# Patient Record
Sex: MALE | Race: WHITE | NOT HISPANIC OR LATINO | URBAN - METROPOLITAN AREA
[De-identification: names, ages, dates, MRNs, and addresses within clinical notes are randomized per-mention and may not be internally consistent; named-entity substitution may affect disease eponyms.]

---

## 2017-11-07 ENCOUNTER — ON CAMPUS - OUTPATIENT (AMBULATORY)
Dept: URBAN - METROPOLITAN AREA HOSPITAL 2 | Facility: HOSPITAL | Age: 67
End: 2017-11-07

## 2017-11-07 VITALS
DIASTOLIC BLOOD PRESSURE: 93 MMHG | HEART RATE: 61 BPM | DIASTOLIC BLOOD PRESSURE: 82 MMHG | HEART RATE: 58 BPM | OXYGEN SATURATION: 94 % | DIASTOLIC BLOOD PRESSURE: 75 MMHG | SYSTOLIC BLOOD PRESSURE: 147 MMHG | SYSTOLIC BLOOD PRESSURE: 128 MMHG | DIASTOLIC BLOOD PRESSURE: 89 MMHG | SYSTOLIC BLOOD PRESSURE: 126 MMHG | RESPIRATION RATE: 18 BRPM | SYSTOLIC BLOOD PRESSURE: 117 MMHG | WEIGHT: 225 LBS | TEMPERATURE: 97.3 F | DIASTOLIC BLOOD PRESSURE: 73 MMHG | SYSTOLIC BLOOD PRESSURE: 113 MMHG | HEART RATE: 60 BPM | OXYGEN SATURATION: 98 % | HEART RATE: 62 BPM | DIASTOLIC BLOOD PRESSURE: 72 MMHG | HEIGHT: 72 IN | OXYGEN SATURATION: 100 % | SYSTOLIC BLOOD PRESSURE: 115 MMHG | RESPIRATION RATE: 17 BRPM | SYSTOLIC BLOOD PRESSURE: 148 MMHG | SYSTOLIC BLOOD PRESSURE: 112 MMHG | OXYGEN SATURATION: 99 % | DIASTOLIC BLOOD PRESSURE: 69 MMHG | HEART RATE: 64 BPM | RESPIRATION RATE: 16 BRPM

## 2017-11-07 DIAGNOSIS — Z09 ENCOUNTER FOR FOLLOW-UP EXAMINATION AFTER COMPLETED TREATMEN: ICD-10-CM

## 2017-11-07 DIAGNOSIS — Z86.010 PERSONAL HISTORY OF COLONIC POLYPS: ICD-10-CM

## 2017-11-07 DIAGNOSIS — K64.1 SECOND DEGREE HEMORRHOIDS: ICD-10-CM

## 2017-11-07 PROCEDURE — G0105 COLORECTAL SCRN; HI RISK IND: HCPCS | Performed by: INTERNAL MEDICINE

## 2017-11-07 RX ADMIN — PROPOFOL: 10 INJECTION, EMULSION INTRAVENOUS at 10:16

## 2017-12-07 ENCOUNTER — HOSPITAL ENCOUNTER (OUTPATIENT)
Dept: FAMILY MEDICINE CLINIC | Facility: CLINIC | Age: 67
Setting detail: SPECIMEN
Discharge: HOME | End: 2017-12-07
Attending: FAMILY MEDICINE | Admitting: FAMILY MEDICINE

## 2017-12-07 LAB
ALBUMIN SERPL-MCNC: 4.3 G/DL (ref 3.5–4.8)
ALBUMIN/GLOB SERPL: 1.5 {RATIO} (ref 1–1.7)
ALP SERPL-CCNC: 40 IU/L (ref 32–91)
ALT SERPL-CCNC: 28 IU/L (ref 17–63)
ANION GAP SERPL CALC-SCNC: 11.1 MMOL/L (ref 10–20)
AST SERPL-CCNC: 31 IU/L (ref 15–41)
BASOPHILS # BLD AUTO: 0.1 10*3/UL (ref 0–0.2)
BASOPHILS NFR BLD AUTO: 1 % (ref 0–2)
BILIRUB SERPL-MCNC: 1 MG/DL (ref 0.3–1.2)
BILIRUB UR QL STRIP: NEGATIVE MG/DL
BUN SERPL-MCNC: 17 MG/DL (ref 8–20)
BUN/CREAT SERPL: 15.5 (ref 6.2–20.3)
CALCIUM SERPL-MCNC: 9.3 MG/DL (ref 8.9–10.3)
CASTS URNS QL MICRO: NORMAL /[LPF]
CHLORIDE SERPL-SCNC: 101 MMOL/L (ref 101–111)
CHOLEST SERPL-MCNC: 209 MG/DL
CHOLEST/HDLC SERPL: 4.8 {RATIO}
COLOR UR: YELLOW
CONV BACTERIA IN URINE MICRO: NEGATIVE
CONV CLARITY OF URINE: CLEAR
CONV CO2: 30 MMOL/L (ref 22–32)
CONV HYALINE CASTS IN URINE MICRO: 0 /[LPF] (ref 0–5)
CONV LDL CHOLESTEROL DIRECT: 120 MG/DL (ref 0–100)
CONV PROTEIN IN URINE BY AUTOMATED TEST STRIP: NEGATIVE MG/DL
CONV SMALL ROUND CELLS: NORMAL /[HPF]
CONV TOTAL PROTEIN: 7.1 G/DL (ref 6.1–7.9)
CONV UROBILINOGEN IN URINE BY AUTOMATED TEST STRIP: 0.2 MG/DL
CREAT UR-MCNC: 1.1 MG/DL (ref 0.7–1.2)
CULTURE INDICATED?: NORMAL
DIFFERENTIAL METHOD BLD: (no result)
EOSINOPHIL # BLD AUTO: 0.3 10*3/UL (ref 0–0.3)
EOSINOPHIL # BLD AUTO: 5 % (ref 0–3)
ERYTHROCYTE [DISTWIDTH] IN BLOOD BY AUTOMATED COUNT: 13.2 % (ref 11.5–14.5)
GLOBULIN UR ELPH-MCNC: 2.8 G/DL (ref 2.5–3.8)
GLUCOSE SERPL-MCNC: 94 MG/DL (ref 65–99)
GLUCOSE UR QL: NEGATIVE MG/DL
HCT VFR BLD AUTO: 42.4 % (ref 40–54)
HDLC SERPL-MCNC: 44 MG/DL
HGB BLD-MCNC: 14.5 G/DL (ref 14–18)
HGB UR QL STRIP: NEGATIVE
KETONES UR QL STRIP: NEGATIVE MG/DL
LDLC/HDLC SERPL: 2.7 {RATIO}
LEUKOCYTE ESTERASE UR QL STRIP: NEGATIVE
LIPID INTERPRETATION: ABNORMAL
LYMPHOCYTES # BLD AUTO: 2.1 10*3/UL (ref 0.8–4.8)
LYMPHOCYTES NFR BLD AUTO: 33 % (ref 18–42)
MCH RBC QN AUTO: 29.7 PG (ref 26–32)
MCHC RBC AUTO-ENTMCNC: 34.3 G/DL (ref 32–36)
MCV RBC AUTO: 86.6 FL (ref 80–94)
MONOCYTES # BLD AUTO: 0.7 10*3/UL (ref 0.1–1.3)
MONOCYTES NFR BLD AUTO: 11 % (ref 2–11)
NEUTROPHILS # BLD AUTO: 3.2 10*3/UL (ref 2.3–8.6)
NEUTROPHILS NFR BLD AUTO: 50 % (ref 50–75)
NITRITE UR QL STRIP: NEGATIVE
NRBC BLD AUTO-RTO: 0 /100{WBCS}
NRBC/RBC NFR BLD MANUAL: 0 10*3/UL
PH UR STRIP.AUTO: 7.5 [PH] (ref 4.5–8)
PLATELET # BLD AUTO: 280 10*3/UL (ref 150–450)
PMV BLD AUTO: 7.8 FL (ref 7.4–10.4)
POTASSIUM SERPL-SCNC: 4.1 MMOL/L (ref 3.6–5.1)
PSA SERPL-MCNC: 1.27 NG/ML (ref 0–4)
RBC # BLD AUTO: 4.89 10*6/UL (ref 4.6–6)
RBC #/AREA URNS HPF: 0 /[HPF] (ref 0–3)
SODIUM SERPL-SCNC: 138 MMOL/L (ref 136–144)
SP GR UR: 1.01 (ref 1–1.03)
SPERM URNS QL MICRO: NORMAL /[HPF]
SQUAMOUS SPT QL MICRO: 0 /[HPF] (ref 0–5)
T4 FREE SERPL-MCNC: 0.75 NG/DL (ref 0.58–1.64)
TRIGL SERPL-MCNC: 194 MG/DL
TSH SERPL-ACNC: 2.78 UIU/ML (ref 0.34–5.6)
UNIDENT CRYS URNS QL MICRO: NORMAL /[HPF]
VIT B12 SERPL-MCNC: 209 PG/ML (ref 180–914)
VLDLC SERPL CALC-MCNC: 45.7 MG/DL
WBC # BLD AUTO: 6.4 10*3/UL (ref 4.5–11.5)
WBC #/AREA URNS HPF: 0 /[HPF] (ref 0–5)
YEAST SPEC QL WET PREP: NORMAL /[HPF]

## 2018-12-05 ENCOUNTER — HOSPITAL ENCOUNTER (OUTPATIENT)
Dept: FAMILY MEDICINE CLINIC | Facility: CLINIC | Age: 68
Setting detail: SPECIMEN
Discharge: HOME | End: 2018-12-05
Attending: FAMILY MEDICINE | Admitting: FAMILY MEDICINE

## 2018-12-05 LAB
25(OH)D3 SERPL-MCNC: 71 NG/ML (ref 30–100)
ALBUMIN SERPL-MCNC: 4 G/DL (ref 3.5–4.8)
ALBUMIN/GLOB SERPL: 1.5 {RATIO} (ref 1–1.7)
ALP SERPL-CCNC: 43 IU/L (ref 32–91)
ALT SERPL-CCNC: 20 IU/L (ref 17–63)
ANION GAP SERPL CALC-SCNC: 11 MMOL/L (ref 10–20)
AST SERPL-CCNC: 26 IU/L (ref 15–41)
BASOPHILS # BLD AUTO: 0.1 10*3/UL (ref 0–0.2)
BASOPHILS NFR BLD AUTO: 1 % (ref 0–2)
BILIRUB SERPL-MCNC: 1 MG/DL (ref 0.3–1.2)
BILIRUB UR QL STRIP: NEGATIVE MG/DL
BUN SERPL-MCNC: 17 MG/DL (ref 8–20)
BUN/CREAT SERPL: 14.2 (ref 6.2–20.3)
CALCIUM SERPL-MCNC: 9.1 MG/DL (ref 8.9–10.3)
CASTS URNS QL MICRO: NORMAL /[LPF]
CHLORIDE SERPL-SCNC: 99 MMOL/L (ref 101–111)
CHOLEST SERPL-MCNC: 199 MG/DL
CHOLEST/HDLC SERPL: 4.3 {RATIO}
COLOR UR: YELLOW
CONV BACTERIA IN URINE MICRO: NEGATIVE
CONV CLARITY OF URINE: CLEAR
CONV CO2: 31 MMOL/L (ref 22–32)
CONV HYALINE CASTS IN URINE MICRO: 0 /[LPF] (ref 0–5)
CONV LDL CHOLESTEROL DIRECT: 118 MG/DL (ref 0–100)
CONV PROTEIN IN URINE BY AUTOMATED TEST STRIP: NEGATIVE MG/DL
CONV SMALL ROUND CELLS: NORMAL /[HPF]
CONV TOTAL PROTEIN: 6.6 G/DL (ref 6.1–7.9)
CONV UROBILINOGEN IN URINE BY AUTOMATED TEST STRIP: 0.2 MG/DL
CREAT UR-MCNC: 1.2 MG/DL (ref 0.7–1.2)
CULTURE INDICATED?: NORMAL
DIFFERENTIAL METHOD BLD: (no result)
EOSINOPHIL # BLD AUTO: 0.3 10*3/UL (ref 0–0.3)
EOSINOPHIL # BLD AUTO: 5 % (ref 0–3)
ERYTHROCYTE [DISTWIDTH] IN BLOOD BY AUTOMATED COUNT: 13.4 % (ref 11.5–14.5)
GLOBULIN UR ELPH-MCNC: 2.6 G/DL (ref 2.5–3.8)
GLUCOSE SERPL-MCNC: 98 MG/DL (ref 65–99)
GLUCOSE UR QL: NEGATIVE MG/DL
HBA1C MFR BLD: 5.5 % (ref 0–5.6)
HCT VFR BLD AUTO: 41.1 % (ref 40–54)
HDLC SERPL-MCNC: 46 MG/DL
HGB BLD-MCNC: 14.5 G/DL (ref 14–18)
HGB UR QL STRIP: NEGATIVE
KETONES UR QL STRIP: NEGATIVE MG/DL
LDLC/HDLC SERPL: 2.5 {RATIO}
LEUKOCYTE ESTERASE UR QL STRIP: NEGATIVE
LIPID INTERPRETATION: ABNORMAL
LYMPHOCYTES # BLD AUTO: 1.8 10*3/UL (ref 0.8–4.8)
LYMPHOCYTES NFR BLD AUTO: 32 % (ref 18–42)
MCH RBC QN AUTO: 30.6 PG (ref 26–32)
MCHC RBC AUTO-ENTMCNC: 35.3 G/DL (ref 32–36)
MCV RBC AUTO: 86.7 FL (ref 80–94)
MONOCYTES # BLD AUTO: 0.6 10*3/UL (ref 0.1–1.3)
MONOCYTES NFR BLD AUTO: 11 % (ref 2–11)
NEUTROPHILS # BLD AUTO: 2.9 10*3/UL (ref 2.3–8.6)
NEUTROPHILS NFR BLD AUTO: 51 % (ref 50–75)
NITRITE UR QL STRIP: NEGATIVE
NRBC BLD AUTO-RTO: 0 /100{WBCS}
NRBC/RBC NFR BLD MANUAL: 0 10*3/UL
PH UR STRIP.AUTO: 7.5 [PH] (ref 4.5–8)
PLATELET # BLD AUTO: 275 10*3/UL (ref 150–450)
PMV BLD AUTO: 7.2 FL (ref 7.4–10.4)
POTASSIUM SERPL-SCNC: 4 MMOL/L (ref 3.6–5.1)
PSA SERPL-MCNC: 0.94 NG/ML (ref 0–4)
RBC # BLD AUTO: 4.75 10*6/UL (ref 4.6–6)
RBC #/AREA URNS HPF: 0 /[HPF] (ref 0–3)
SODIUM SERPL-SCNC: 137 MMOL/L (ref 136–144)
SP GR UR: 1.01 (ref 1–1.03)
SPERM URNS QL MICRO: NORMAL /[HPF]
SQUAMOUS SPT QL MICRO: 0 /[HPF] (ref 0–5)
T4 FREE SERPL-MCNC: 0.92 NG/DL (ref 0.58–1.64)
TRIGL SERPL-MCNC: 162 MG/DL
TSH SERPL-ACNC: 3.17 UIU/ML (ref 0.34–5.6)
UNIDENT CRYS URNS QL MICRO: NORMAL /[HPF]
VIT B12 SERPL-MCNC: 271 PG/ML (ref 180–914)
VLDLC SERPL CALC-MCNC: 35.2 MG/DL
WBC # BLD AUTO: 5.7 10*3/UL (ref 4.5–11.5)
WBC #/AREA URNS HPF: 0 /[HPF] (ref 0–5)
YEAST SPEC QL WET PREP: NORMAL /[HPF]

## 2019-09-25 ENCOUNTER — OFFICE VISIT (OUTPATIENT)
Dept: PODIATRY | Facility: CLINIC | Age: 69
End: 2019-09-25

## 2019-09-25 VITALS
DIASTOLIC BLOOD PRESSURE: 89 MMHG | HEART RATE: 64 BPM | HEIGHT: 72 IN | WEIGHT: 225 LBS | SYSTOLIC BLOOD PRESSURE: 146 MMHG | BODY MASS INDEX: 30.48 KG/M2

## 2019-09-25 DIAGNOSIS — M72.2 PLANTAR FASCIITIS, LEFT: Primary | ICD-10-CM

## 2019-09-25 PROCEDURE — 99203 OFFICE O/P NEW LOW 30 MIN: CPT | Performed by: PODIATRIST

## 2019-09-25 RX ORDER — SILDENAFIL 100 MG/1
TABLET, FILM COATED ORAL
COMMUNITY
Start: 2015-10-22 | End: 2022-11-11 | Stop reason: SDUPTHER

## 2019-09-25 RX ORDER — ATENOLOL 25 MG/1
TABLET ORAL EVERY 24 HOURS
COMMUNITY
Start: 2018-06-08 | End: 2019-11-19 | Stop reason: SDUPTHER

## 2019-09-25 RX ORDER — TRIAMTERENE AND HYDROCHLOROTHIAZIDE 37.5; 25 MG/1; MG/1
CAPSULE ORAL
COMMUNITY
Start: 2019-07-02 | End: 2019-12-30

## 2019-09-25 RX ORDER — ERGOCALCIFEROL 1.25 MG/1
CAPSULE ORAL
COMMUNITY
Start: 2019-08-20 | End: 2019-10-23 | Stop reason: SDUPTHER

## 2019-09-25 RX ORDER — ERGOCALCIFEROL 1.25 MG/1
1 CAPSULE ORAL
COMMUNITY
Start: 2018-03-05 | End: 2020-12-07 | Stop reason: SDUPTHER

## 2019-09-25 NOTE — PROGRESS NOTES
09/25/2019  Foot and Ankle Surgery - New Patient   Provider: Dr. Benny Villegas DPM  Location: [facility]    Subjective:  Luis Enrique Aldana is a 69 y.o. male.     Chief Complaint   Patient presents with   • Left Foot - Pain       HPI: Patient is a 69-year-old male that presents with recent onset of pain involving the left foot.  He localizes discomfort to the arch region.  Symptoms started approximately 6 weeks ago after vacation.  He states that he did do a fairly significant amount of hiking with symptoms starting the following day.  He did notice some improvement until approximately 2 weeks ago when exacerbation was noted.  Today, he rates his pain a 2 out of 10.  He states that it feels like a bruise.  He has not had issues in the past.  Denies any injury.    No Known Allergies    Past Medical History:   Diagnosis Date   • Hypertension        History reviewed. No pertinent surgical history.    Family History   Problem Relation Age of Onset   • Cancer Father    • Hypertension Father    • Diabetes Maternal Grandfather        Social History     Socioeconomic History   • Marital status:      Spouse name: Not on file   • Number of children: Not on file   • Years of education: Not on file   • Highest education level: Not on file   Tobacco Use   • Smoking status: Never Smoker   Substance and Sexual Activity   • Alcohol use: Yes   • Drug use: No   • Sexual activity: Defer        Current Outpatient Medications on File Prior to Visit   Medication Sig Dispense Refill   • atenolol (TENORMIN) 25 MG tablet Daily.     • sildenafil (VIAGRA) 100 MG tablet VIAGRA 100 MG TABS     • triamterene-hydrochlorothiazide (DYAZIDE) 37.5-25 MG per capsule      • vitamin D (ERGOCALCIFEROL) 25902 units capsule capsule      • vitamin D (ERGOCALCIFEROL) 32760 units capsule capsule 1 capsule Every 7 (Seven) Days.       No current facility-administered medications on file prior to visit.        Review of Systems:  General: Denies  "fever, chills, fatigue, and weakness.  Eyes: Denies vision loss, blurry vision, and excessive redness.  ENT: Denies hearing issues and difficulty swallowing.  Cardiovascular: Denies palpitations, chest pain, or syncopal episodes.  Respiratory: Denies shortness of breath, wheezing, and coughing.  GI: Denies abdominal pain, nausea, and vomiting.   : Denies frequency, hematuria, and urgency.  Musculoskeletal: + Left foot pain  Derm: Denies rash, open wounds, or suspicious lesions.  Neuro: Denies headaches, numbness, loss of coordination, and tremors.  Psych: Denies anxiety and depression.  Endocrine: Denies temperature intolerance and changes in appetite.  Heme: Denies bleeding disorders or abnormal bruising.     Objective   /89   Pulse 64   Ht 182.9 cm (72\")   Wt 102 kg (225 lb)   BMI 30.52 kg/m²     General Exam  Appearance: appears stated age and healthy   Orientation: alert and oriented to person, place, and time   Affect: appropriate   Gait: unimpaired     Foot/Ankle Exam  Inspection and Palpation  Ecchymosis - Left: none  Tenderness - Left: none   Swelling - Left: none    Arch - Left: normal  Hammertoes - Left: absent  Claw Toes - Left: absent  Mallet Toes - Left: absent  Hallux Valgus - Left: no  Hallux Limitus - Left: no  Skin - Left: skin intact     Vascular  Dorsalis Pedis - Left: 3+  Posterior Tibial - Left: 3+  Skin Temperature -  Left: warm    CFT - Left: < 3 seconds    Neurologic  Saphenous Nerve Sensation  - Left: normal  Tibial Nerve Sensation - Left: normal  Superficial Peroneal Nerve Sensation - Left: normal  Deep Peroneal Nerve Sensation - Left: normal  Sural Nerve Sensation - Left: normal  Achilles Reflex - Left: 2+    Muscle Strength  Dorsiflexion - Left: 5  Plantar Flexion - Left: 5  Eversion - Left: 5  Inversion - Left: 5  Great Toe Extension - Left: 5  Great Toe Flexion - Left: 5    Range of Motion  Normal left ankle ROM    Left Foot/Ankle Comments  Mild point discomfort noted to the " plantar medial arch region just distal to the plantar fascia origin.  No gross deformity or instability        Assessment/Plan   Luis Enrique was seen today for pain.    Diagnoses and all orders for this visit:    Plantar fasciitis, left  -     XR Foot 3+ View Left      Patient presents with sudden onset of discomfort involving the plantar medial aspect of the left foot.  Imaging was reviewed showing no obvious fractures, dislocations or degenerative changes.  He does have a small inferior calcaneal spur formation.  On exam, he has point discomfort involving the medial band of the plantar fascia.  I explained the diagnosis and treatment options.  No significant inflammation is present today and he is relatively asymptomatic.  I have asked that he acquire a pair of over-the-counter arch supports.  We did discuss proper use and effects.  I would like him to start stretching and manual therapy exercises on a routine basis.  Symptoms should gradually improve.  I have asked that he call with any additional questions or concerns.  I will see him on an as-needed basis.    Orders Placed This Encounter   Procedures   • XR Foot 3+ View Left     Weight Bearing     Order Specific Question:   Reason for Exam:     Answer:   Left foot pain for about 2 months mainly in the arch RM 13 WB        Note is dictated utilizing voice recognition software. Unfortunately this leads to occasional typographical errors. I apologize in advance if the situation occurs. If questions occur please do not hesitate to call our office.

## 2019-10-23 RX ORDER — ERGOCALCIFEROL 1.25 MG/1
CAPSULE ORAL
Qty: 12 CAPSULE | Refills: 0 | Status: SHIPPED | OUTPATIENT
Start: 2019-10-23 | End: 2020-01-14

## 2019-11-19 RX ORDER — ATENOLOL 25 MG/1
TABLET ORAL
Qty: 90 TABLET | Refills: 4 | Status: SHIPPED | OUTPATIENT
Start: 2019-11-19 | End: 2020-12-08

## 2019-12-30 RX ORDER — TRIAMTERENE AND HYDROCHLOROTHIAZIDE 37.5; 25 MG/1; MG/1
CAPSULE ORAL
Qty: 90 CAPSULE | Refills: 4 | Status: SHIPPED | OUTPATIENT
Start: 2019-12-30 | End: 2021-03-24

## 2020-01-14 RX ORDER — ERGOCALCIFEROL 1.25 MG/1
CAPSULE ORAL
Qty: 13 CAPSULE | Refills: 1 | Status: SHIPPED | OUTPATIENT
Start: 2020-01-14 | End: 2020-10-08

## 2020-01-14 RX ORDER — ERGOCALCIFEROL 1.25 MG/1
CAPSULE ORAL
Qty: 12 CAPSULE | Refills: 0 | Status: SHIPPED | OUTPATIENT
Start: 2020-01-14 | End: 2020-01-14

## 2020-10-08 RX ORDER — ERGOCALCIFEROL 1.25 MG/1
CAPSULE ORAL
Qty: 13 CAPSULE | Refills: 1 | Status: SHIPPED | OUTPATIENT
Start: 2020-10-08 | End: 2021-03-22

## 2020-12-07 ENCOUNTER — OFFICE VISIT (OUTPATIENT)
Dept: FAMILY MEDICINE CLINIC | Facility: CLINIC | Age: 70
End: 2020-12-07

## 2020-12-07 ENCOUNTER — LAB (OUTPATIENT)
Dept: FAMILY MEDICINE CLINIC | Facility: CLINIC | Age: 70
End: 2020-12-07

## 2020-12-07 VITALS
TEMPERATURE: 96.8 F | RESPIRATION RATE: 16 BRPM | DIASTOLIC BLOOD PRESSURE: 100 MMHG | BODY MASS INDEX: 31.69 KG/M2 | SYSTOLIC BLOOD PRESSURE: 150 MMHG | OXYGEN SATURATION: 97 % | HEIGHT: 72 IN | HEART RATE: 65 BPM | WEIGHT: 234 LBS

## 2020-12-07 DIAGNOSIS — Z23 IMMUNIZATION DUE: ICD-10-CM

## 2020-12-07 DIAGNOSIS — R97.20 ELEVATED PROSTATE SPECIFIC ANTIGEN (PSA): ICD-10-CM

## 2020-12-07 DIAGNOSIS — E55.9 VITAMIN D DEFICIENCY: ICD-10-CM

## 2020-12-07 DIAGNOSIS — I10 ESSENTIAL HYPERTENSION: ICD-10-CM

## 2020-12-07 DIAGNOSIS — E78.2 MIXED HYPERLIPIDEMIA: ICD-10-CM

## 2020-12-07 DIAGNOSIS — R79.9 ABNORMAL FINDING OF BLOOD CHEMISTRY, UNSPECIFIED: ICD-10-CM

## 2020-12-07 DIAGNOSIS — Z00.00 MEDICARE ANNUAL WELLNESS VISIT, SUBSEQUENT: Primary | ICD-10-CM

## 2020-12-07 LAB
25(OH)D3 SERPL-MCNC: 46.2 NG/ML (ref 30–100)
ALBUMIN SERPL-MCNC: 4.8 G/DL (ref 3.5–5.2)
ALBUMIN/GLOB SERPL: 1.7 G/DL
ALP SERPL-CCNC: 45 U/L (ref 39–117)
ALT SERPL W P-5'-P-CCNC: 16 U/L (ref 1–41)
ANION GAP SERPL CALCULATED.3IONS-SCNC: 11.8 MMOL/L (ref 5–15)
AST SERPL-CCNC: 23 U/L (ref 1–40)
BASOPHILS # BLD AUTO: 0.1 10*3/MM3 (ref 0–0.2)
BASOPHILS NFR BLD AUTO: 1 % (ref 0–1.5)
BILIRUB SERPL-MCNC: 0.5 MG/DL (ref 0–1.2)
BILIRUB UR QL STRIP: NEGATIVE
BUN SERPL-MCNC: 18 MG/DL (ref 8–23)
BUN/CREAT SERPL: 14.4 (ref 7–25)
CALCIUM SPEC-SCNC: 9.9 MG/DL (ref 8.6–10.5)
CHLORIDE SERPL-SCNC: 100 MMOL/L (ref 98–107)
CHOLEST SERPL-MCNC: 190 MG/DL (ref 0–200)
CLARITY UR: CLEAR
CO2 SERPL-SCNC: 29.2 MMOL/L (ref 22–29)
COLOR UR: YELLOW
CREAT SERPL-MCNC: 1.25 MG/DL (ref 0.76–1.27)
DEPRECATED RDW RBC AUTO: 40.3 FL (ref 37–54)
EOSINOPHIL # BLD AUTO: 0.4 10*3/MM3 (ref 0–0.4)
EOSINOPHIL NFR BLD AUTO: 5.9 % (ref 0.3–6.2)
ERYTHROCYTE [DISTWIDTH] IN BLOOD BY AUTOMATED COUNT: 13.8 % (ref 12.3–15.4)
GFR SERPL CREATININE-BSD FRML MDRD: 57 ML/MIN/1.73
GLOBULIN UR ELPH-MCNC: 2.9 GM/DL
GLUCOSE SERPL-MCNC: 98 MG/DL (ref 65–99)
GLUCOSE UR STRIP-MCNC: NEGATIVE MG/DL
HBA1C MFR BLD: 6 % (ref 3.5–5.6)
HCT VFR BLD AUTO: 46.4 % (ref 37.5–51)
HDLC SERPL-MCNC: 46 MG/DL (ref 40–60)
HGB BLD-MCNC: 15.3 G/DL (ref 13–17.7)
HGB UR QL STRIP.AUTO: NEGATIVE
KETONES UR QL STRIP: NEGATIVE
LDLC SERPL CALC-MCNC: 117 MG/DL (ref 0–100)
LDLC/HDLC SERPL: 2.46 {RATIO}
LEUKOCYTE ESTERASE UR QL STRIP.AUTO: NEGATIVE
LYMPHOCYTES # BLD AUTO: 2 10*3/MM3 (ref 0.7–3.1)
LYMPHOCYTES NFR BLD AUTO: 30.9 % (ref 19.6–45.3)
MCH RBC QN AUTO: 28 PG (ref 26.6–33)
MCHC RBC AUTO-ENTMCNC: 33 G/DL (ref 31.5–35.7)
MCV RBC AUTO: 84.9 FL (ref 79–97)
MONOCYTES # BLD AUTO: 0.7 10*3/MM3 (ref 0.1–0.9)
MONOCYTES NFR BLD AUTO: 11.3 % (ref 5–12)
NEUTROPHILS NFR BLD AUTO: 3.2 10*3/MM3 (ref 1.7–7)
NEUTROPHILS NFR BLD AUTO: 50.9 % (ref 42.7–76)
NITRITE UR QL STRIP: NEGATIVE
NRBC BLD AUTO-RTO: 0 /100 WBC (ref 0–0.2)
PH UR STRIP.AUTO: 7.5 [PH] (ref 5–8)
PLATELET # BLD AUTO: 328 10*3/MM3 (ref 140–450)
PMV BLD AUTO: 7.6 FL (ref 6–12)
POTASSIUM SERPL-SCNC: 5 MMOL/L (ref 3.5–5.2)
PROT SERPL-MCNC: 7.7 G/DL (ref 6–8.5)
PROT UR QL STRIP: NEGATIVE
PSA SERPL-MCNC: 1.96 NG/ML (ref 0–4)
RBC # BLD AUTO: 5.47 10*6/MM3 (ref 4.14–5.8)
SODIUM SERPL-SCNC: 141 MMOL/L (ref 136–145)
SP GR UR STRIP: 1.02 (ref 1–1.03)
T4 FREE SERPL-MCNC: 1.22 NG/DL (ref 0.93–1.7)
TRIGL SERPL-MCNC: 154 MG/DL (ref 0–150)
TSH SERPL DL<=0.05 MIU/L-ACNC: 2.93 UIU/ML (ref 0.27–4.2)
UROBILINOGEN UR QL STRIP: NORMAL
VIT B12 BLD-MCNC: 394 PG/ML (ref 211–946)
VLDLC SERPL-MCNC: 27 MG/DL (ref 5–40)
WBC # BLD AUTO: 6.4 10*3/MM3 (ref 3.4–10.8)

## 2020-12-07 PROCEDURE — G0439 PPPS, SUBSEQ VISIT: HCPCS | Performed by: FAMILY MEDICINE

## 2020-12-07 PROCEDURE — 84443 ASSAY THYROID STIM HORMONE: CPT | Performed by: FAMILY MEDICINE

## 2020-12-07 PROCEDURE — 82607 VITAMIN B-12: CPT | Performed by: FAMILY MEDICINE

## 2020-12-07 PROCEDURE — 99214 OFFICE O/P EST MOD 30 MIN: CPT | Performed by: FAMILY MEDICINE

## 2020-12-07 PROCEDURE — 85025 COMPLETE CBC W/AUTO DIFF WBC: CPT | Performed by: FAMILY MEDICINE

## 2020-12-07 PROCEDURE — 84439 ASSAY OF FREE THYROXINE: CPT | Performed by: FAMILY MEDICINE

## 2020-12-07 PROCEDURE — 80061 LIPID PANEL: CPT | Performed by: FAMILY MEDICINE

## 2020-12-07 PROCEDURE — G0009 ADMIN PNEUMOCOCCAL VACCINE: HCPCS | Performed by: FAMILY MEDICINE

## 2020-12-07 PROCEDURE — 80053 COMPREHEN METABOLIC PANEL: CPT | Performed by: FAMILY MEDICINE

## 2020-12-07 PROCEDURE — 83036 HEMOGLOBIN GLYCOSYLATED A1C: CPT | Performed by: FAMILY MEDICINE

## 2020-12-07 PROCEDURE — 81003 URINALYSIS AUTO W/O SCOPE: CPT | Performed by: FAMILY MEDICINE

## 2020-12-07 PROCEDURE — 84153 ASSAY OF PSA TOTAL: CPT | Performed by: FAMILY MEDICINE

## 2020-12-07 PROCEDURE — 82306 VITAMIN D 25 HYDROXY: CPT | Performed by: FAMILY MEDICINE

## 2020-12-07 PROCEDURE — 90670 PCV13 VACCINE IM: CPT | Performed by: FAMILY MEDICINE

## 2020-12-07 RX ORDER — ASPIRIN 81 MG/1
81 TABLET ORAL DAILY
COMMUNITY

## 2020-12-07 NOTE — ASSESSMENT & PLAN NOTE
Luis Enrique Aldana is a 70 y.o. male who presents for a Subsequent Medicare Wellness Visit.   Upon arrival to the room Miguelito had the Medicare health risk assessment performed by the nursing staff.  Neither the questions themselves nor the answers that he gave to those questions prompted any concern on his part or on the part of the nursing staff.  As far as his preventative care examinations and his preventative care immunizations they are as listed below.  Screening tests recommended:    Colonoscopy--up-to-date   PSA--he will have PSA done today      Immunization:  Influenza -up-to-date  Prevnar-patient will check with the pharmacy on this 1  Pneumovax-patient will check with the pharmacy on this 1 also  Tetanus-he needs to get this at the pharmacy  Shingles vaccine-he may have had Zostavax.  He will check with pharmacy

## 2020-12-07 NOTE — PATIENT INSTRUCTIONS
Medicare Wellness  Personal Prevention Plan of Service     Date of Office Visit:  2020  Encounter Provider:  Agustin Fonseca MD  Place of Service:  [facility]  Patient Name: Luis Enrique Aldana  :  1950    As part of the Medicare Wellness portion of your visit today, we are providing you with this personalized preventive plan of services (PPPS). This plan is based upon recommendations of the [place] Preventive Services Task Force (USPSTF) and the Advisory Committee on Immunization Practices (ACIP).    This lists the preventive care services that should be considered, and provides dates of when you are due. Items listed as completed are up-to-date and do not require any further intervention.    Health Maintenance   Topic Date Due   • TDAP/TD VACCINES (1 - Tdap) 1970   • ZOSTER VACCINE (1 of 2) 2001   • Pneumococcal Vaccine 65+ (1 of 1 - PPSV23) 2016   • HEPATITIS C SCREENING  2019   • ANNUAL WELLNESS VISIT  2019   • LIPID PANEL  2020   • COLONOSCOPY  2027   • INFLUENZA VACCINE  Completed       No orders of the defined types were placed in this encounter.      No follow-ups on file.

## 2020-12-07 NOTE — PROGRESS NOTES
The ABCs of the Annual Wellness Visit  Subsequent Medicare Wellness Visit    Chief Complaint   Patient presents with   • Medicare Wellness-subsequent       Subjective   History of Present Illness:  Luis Enrique Aldana is a 70 y.o. male who presents for a Subsequent Medicare Wellness Visit.   Upon arrival to the room Miguelito had the Medicare health risk assessment performed by the nursing staff.  Neither the questions themselves nor the answers that he gave to those questions prompted any concern on his part or on the part of the nursing staff.  As far as his preventative care examinations and his preventative care immunizations they are as listed below.  Screening tests recommended:    Colonoscopy--up-to-date   PSA--he will have PSA done today      Immunization:  Influenza -up-to-date  Prevnar-patient will check with the pharmacy on this 1  Pneumovax-patient will check with the pharmacy on this 1 also  Tetanus-he needs to get this at the pharmacy  Shingles vaccine-he may have had Zostavax.  He will check with pharmacy                Patient is also here today for a physical exam and to have some laboratory testing done.  He is followed in the office for hypertension, hyperlipidemia, and vitamin D deficiency.  He is under treatment for these disorders and we will review those medications and adjust according to the lab results once they are available.  His review of systems and was unremarkable.                  HEALTH RISK ASSESSMENT    Recent Hospitalizations:  No hospitalization(s) within the last year.    Current Medical Providers:  Patient Care Team:  Agustin Fonseca MD as PCP - General (Family Medicine)    Smoking Status:  Social History     Tobacco Use   Smoking Status Never Smoker   Smokeless Tobacco Never Used       Alcohol Consumption:  Social History     Substance and Sexual Activity   Alcohol Use Yes    Comment: rarely       Depression Screen:   PHQ-2/PHQ-9 Depression Screening 12/7/2020   Little interest or  pleasure in doing things 0   Feeling down, depressed, or hopeless 0   Total Score 0       Fall Risk Screen:  ALANNAH Fall Risk Assessment was completed, and patient is at MODERATE risk for falls. Assessment completed on:12/7/2020    Health Habits and Functional and Cognitive Screening:  Functional & Cognitive Status 12/7/2020   Do you have difficulty preparing food and eating? No   Do you have difficulty bathing yourself, getting dressed or grooming yourself? No   Do you have difficulty using the toilet? No   Do you have difficulty moving around from place to place? No   Do you have trouble with steps or getting out of a bed or a chair? No   Current Diet Well Balanced Diet   Dental Exam Up to date   Eye Exam Up to date   Exercise (times per week) 3 times per week   Current Exercises Include Cardiovascular Workout   Do you need help using the phone?  No   Are you deaf or do you have serious difficulty hearing?  No   Do you need help with transportation? No   Do you need help shopping? No   Do you need help preparing meals?  No   Do you need help with housework?  No   Do you need help with laundry? No   Do you need help taking your medications? No   Do you need help managing money? No   Do you ever drive or ride in a car without wearing a seat belt? No   Have you felt unusual stress, anger or loneliness in the last month? No   Who do you live with? Spouse   If you need help, do you have trouble finding someone available to you? No   Do you have difficulty concentrating, remembering or making decisions? No         Does the patient have evidence of cognitive impairment? No    Asprin use counseling:Taking ASA appropriately as indicated    Age-appropriate Screening Schedule:  Refer to the list below for future screening recommendations based on patient's age, sex and/or medical conditions. Orders for these recommended tests are listed in the plan section. The patient has been provided with a written plan.    Health  Maintenance   Topic Date Due   • TDAP/TD VACCINES (1 - Tdap) 05/09/1970   • ZOSTER VACCINE (1 of 2) 05/09/2001   • LIPID PANEL  12/07/2020   • COLONOSCOPY  11/07/2027   • INFLUENZA VACCINE  Completed          The following portions of the patient's history were reviewed and updated as appropriate: allergies, current medications, past family history, past medical history, past social history, past surgical history and problem list.    Outpatient Medications Prior to Visit   Medication Sig Dispense Refill   • aspirin 81 MG EC tablet Take 81 mg by mouth Daily.     • atenolol (TENORMIN) 25 MG tablet TAKE 1 TABLET DAILY 90 tablet 4   • sildenafil (VIAGRA) 100 MG tablet VIAGRA 100 MG TABS     • triamterene-hydrochlorothiazide (DYAZIDE) 37.5-25 MG per capsule TAKE 1 CAPSULE DAILY 90 capsule 4   • vitamin D (ERGOCALCIFEROL) 1.25 MG (67979 UT) capsule capsule TAKE 1 CAPSULE BY MOUTH WEEKLY 13 capsule 1   • vitamin D (ERGOCALCIFEROL) 13982 units capsule capsule 1 capsule Every 7 (Seven) Days.       No facility-administered medications prior to visit.        Patient Active Problem List   Diagnosis   • Hyperlipidemia   • Hypertension   • Vitamin D deficiency   • Medicare annual wellness visit, subsequent       Advanced Care Planning:  ACP discussion was held with the patient during this visit. Patient has an advance directive in EMR which is still valid.     Review of Systems   Constitutional: Negative.  Negative for diaphoresis, fatigue and fever.   HENT: Negative.  Negative for congestion, ear pain, hearing loss, postnasal drip, sinus pressure, sore throat, trouble swallowing and voice change.    Eyes: Negative.  Negative for pain, redness and visual disturbance.   Respiratory: Negative.  Negative for cough, chest tightness and shortness of breath.    Cardiovascular: Negative.  Negative for chest pain, palpitations and leg swelling.   Gastrointestinal: Negative.  Negative for abdominal pain, blood in stool, constipation and  "diarrhea.   Endocrine: Negative.  Negative for cold intolerance and heat intolerance.   Genitourinary: Negative.  Negative for difficulty urinating, enuresis, flank pain, frequency and urgency.   Musculoskeletal: Negative.  Negative for arthralgias, back pain, myalgias, neck pain and neck stiffness.   Skin: Negative.  Negative for color change and rash.   Allergic/Immunologic: Negative.  Negative for environmental allergies.   Neurological: Negative.  Negative for syncope, weakness and headaches.   Hematological: Negative.  Does not bruise/bleed easily.   Psychiatric/Behavioral: Negative.  Negative for behavioral problems, decreased concentration, dysphoric mood and suicidal ideas. The patient is not nervous/anxious.    All other systems reviewed and are negative.      Compared to one year ago, the patient feels his physical health is the same.  Compared to one year ago, the patient feels his mental health is the same.    Reviewed chart for potential of high risk medication in the elderly: yes  Reviewed chart for potential of harmful drug interactions in the elderly:yes    Objective         Vitals:    12/07/20 0932   BP: 150/100   BP Location: Left arm   Pulse: 65   Resp: 16   Temp: 96.8 °F (36 °C)   SpO2: 97%   Weight: 106 kg (234 lb)   Height: 182.9 cm (72\")       Body mass index is 31.74 kg/m².  Discussed the patient's BMI with him. The BMI is in the acceptable range.    Physical Exam  Vitals signs reviewed.   Constitutional:       General: He is not in acute distress.     Appearance: Normal appearance. He is well-developed and normal weight.   HENT:      Head: Normocephalic and atraumatic.      Right Ear: Tympanic membrane, ear canal and external ear normal.      Left Ear: Tympanic membrane, ear canal and external ear normal.      Nose: Nose normal.      Mouth/Throat:      Mouth: Mucous membranes are moist.      Pharynx: Oropharynx is clear. No oropharyngeal exudate.   Eyes:      General: Lids are normal. No " scleral icterus.        Right eye: No discharge.         Left eye: No discharge.      Extraocular Movements: Extraocular movements intact.      Conjunctiva/sclera: Conjunctivae normal.      Pupils: Pupils are equal, round, and reactive to light. Pupils are equal.      Right eye: Pupil is round.      Left eye: Pupil is round.   Neck:      Musculoskeletal: Normal range of motion and neck supple.      Thyroid: No thyromegaly.      Vascular: No JVD.   Cardiovascular:      Rate and Rhythm: Normal rate and regular rhythm.      Pulses: Normal pulses.      Heart sounds: Normal heart sounds. No murmur.   Pulmonary:      Effort: Pulmonary effort is normal. No respiratory distress.      Breath sounds: Normal breath sounds. No wheezing or rales.   Chest:      Chest wall: No tenderness.   Abdominal:      General: Bowel sounds are normal. There is no distension.      Palpations: Abdomen is soft.      Tenderness: There is no abdominal tenderness.   Genitourinary:     Rectum: Guaiac result negative.   Musculoskeletal: Normal range of motion.         General: No tenderness or deformity.   Lymphadenopathy:      Cervical: No cervical adenopathy.   Skin:     General: Skin is warm and dry.   Neurological:      General: No focal deficit present.      Mental Status: He is alert and oriented to person, place, and time. Mental status is at baseline.   Psychiatric:         Attention and Perception: He is attentive.         Mood and Affect: Mood normal.         Speech: Speech normal.         Behavior: Behavior normal.         Thought Content: Thought content normal.         Judgment: Judgment normal.               Assessment/Plan   Medicare Risks and Personalized Health Plan  CMS Preventative Services Quick Reference  Abdominal Aortic Aneurysm Screening  Advance Directive Discussion    The above risks/problems have been discussed with the patient.  Pertinent information has been shared with the patient in the After Visit Summary.  Follow up  plans and orders are seen below in the Assessment/Plan Section.    Diagnoses and all orders for this visit:    1. Medicare annual wellness visit, subsequent (Primary)  Assessment & Plan:  Luis Enrique Aldana is a 70 y.o. male who presents for a Subsequent Medicare Wellness Visit.   Upon arrival to the room Miguelito had the Medicare health risk assessment performed by the nursing staff.  Neither the questions themselves nor the answers that he gave to those questions prompted any concern on his part or on the part of the nursing staff.  As far as his preventative care examinations and his preventative care immunizations they are as listed below.  Screening tests recommended:    Colonoscopy--up-to-date   PSA--he will have PSA done today      Immunization:  Influenza -up-to-date  Prevnar-patient will check with the pharmacy on this 1  Pneumovax-patient will check with the pharmacy on this 1 also  Tetanus-he needs to get this at the pharmacy  Shingles vaccine-he may have had Zostavax.  He will check with pharmacy          2. Vitamin D deficiency  Assessment & Plan:  Vitamin D will be rechecked today.  We will continue his replacement      3. Essential hypertension  Assessment & Plan:  Hypertension is worsening.  Continue current treatment regimen.  Dietary sodium restriction.  Weight loss.  Regular aerobic exercise.  Medication changes per orders.  Blood pressure will be reassessed in 3 months.    Patient's blood pressure medicine in the form of atenolol will be increased to 50 mg twice daily.  He will continue his Dyazide.      4. Mixed hyperlipidemia  Assessment & Plan:  Lipid abnormalities are improving with treatment.  Nutritional counseling was provided., The patient was referred to the dietician. and Pharmacotherapy as ordered.  Lipids will be reassessed in 6 months.    Low carbs.   Diet.      Follow Up:  Return in about 3 months (around 3/7/2021) for Recheck.     An After Visit Summary and PPPS were given to the patient.

## 2020-12-07 NOTE — ASSESSMENT & PLAN NOTE
Hypertension is worsening.  Continue current treatment regimen.  Dietary sodium restriction.  Weight loss.  Regular aerobic exercise.  Medication changes per orders.  Blood pressure will be reassessed in 3 months.    Patient's blood pressure medicine in the form of atenolol will be increased to 50 mg twice daily.  He will continue his Dyazide.

## 2020-12-07 NOTE — ASSESSMENT & PLAN NOTE
Lipid abnormalities are improving with treatment.  Nutritional counseling was provided., The patient was referred to the dietician. and Pharmacotherapy as ordered.  Lipids will be reassessed in 6 months.    Low carbs.   Diet.

## 2020-12-08 ENCOUNTER — TELEPHONE (OUTPATIENT)
Dept: FAMILY MEDICINE CLINIC | Facility: CLINIC | Age: 70
End: 2020-12-08

## 2020-12-08 RX ORDER — ATENOLOL 50 MG/1
50 TABLET ORAL 2 TIMES DAILY
Qty: 180 TABLET | Refills: 2 | Status: SHIPPED | OUTPATIENT
Start: 2020-12-08 | End: 2021-10-19 | Stop reason: SDUPTHER

## 2020-12-08 NOTE — TELEPHONE ENCOUNTER
Caller: Luis Enrique Aldana    Relationship: Self    Best call back number: [phone]     Medication needed: atenolol (TENORMIN) 50 MG tablet   twice a day    When do you need the refill by: 12/11/20  What details did the patient provide when requesting the medication: since the doseage was increased at the appt he doesn't have a 90 day supply, he has 15. He needs a new prescription for the higher dose sent in     Does the patient have less than a 3 day supply:  [] Yes  [x] No    What is the patient's preferred pharmacy: [facility] - [address] [phone] [facility] [phone]            ”  
Atenolol 50 mg twice daily was called into [facility] for 90 days  
Patient stated that his shot records needed to be updated     PNEUMOVRAX 23 11/07/12  TDAP 12/07/20  SHINGLES VACCINATION 2013  
SPoke to pt  
Vaccines are updated  
none

## 2021-03-08 ENCOUNTER — OFFICE VISIT (OUTPATIENT)
Dept: FAMILY MEDICINE CLINIC | Facility: CLINIC | Age: 71
End: 2021-03-08

## 2021-03-08 VITALS
RESPIRATION RATE: 16 BRPM | WEIGHT: 239 LBS | HEIGHT: 72 IN | TEMPERATURE: 96.9 F | BODY MASS INDEX: 32.37 KG/M2 | SYSTOLIC BLOOD PRESSURE: 150 MMHG | HEART RATE: 95 BPM | OXYGEN SATURATION: 97 % | DIASTOLIC BLOOD PRESSURE: 98 MMHG

## 2021-03-08 DIAGNOSIS — I10 ESSENTIAL HYPERTENSION: Primary | ICD-10-CM

## 2021-03-08 PROCEDURE — 99213 OFFICE O/P EST LOW 20 MIN: CPT | Performed by: FAMILY MEDICINE

## 2021-03-08 NOTE — ASSESSMENT & PLAN NOTE
Hypertension is improving with treatment.  Blood pressure will be reassessed in 3 months.    Patient doing well on Atenolol and dyazide.

## 2021-03-08 NOTE — PATIENT INSTRUCTIONS
"DASH Eating Plan  DASH stands for \"Dietary Approaches to Stop Hypertension.\" The DASH eating plan is a healthy eating plan that has been shown to reduce high blood pressure (hypertension). It may also reduce your risk for type 2 diabetes, heart disease, and stroke. The DASH eating plan may also help with weight loss.  What are tips for following this plan?    General guidelines  · Avoid eating more than 2,300 mg (milligrams) of salt (sodium) a day. If you have hypertension, you may need to reduce your sodium intake to 1,500 mg a day.  · Limit alcohol intake to no more than 1 drink a day for nonpregnant women and 2 drinks a day for men. One drink equals 12 oz of beer, 5 oz of wine, or 1½ oz of hard liquor.  · Work with your health care provider to maintain a healthy body weight or to lose weight. Ask what an ideal weight is for you.  · Get at least 30 minutes of exercise that causes your heart to beat faster (aerobic exercise) most days of the week. Activities may include walking, swimming, or biking.  · Work with your health care provider or diet and nutrition specialist (dietitian) to adjust your eating plan to your individual calorie needs.  Reading food labels    · Check food labels for the amount of sodium per serving. Choose foods with less than 5 percent of the Daily Value of sodium. Generally, foods with less than 300 mg of sodium per serving fit into this eating plan.  · To find whole grains, look for the word \"whole\" as the first word in the ingredient list.  Shopping  · Buy products labeled as \"low-sodium\" or \"no salt added.\"  · Buy fresh foods. Avoid canned foods and premade or frozen meals.  Cooking  · Avoid adding salt when cooking. Use salt-free seasonings or herbs instead of table salt or sea salt. Check with your health care provider or pharmacist before using salt substitutes.  · Do not fragoso foods. Cook foods using healthy methods such as baking, boiling, grilling, and broiling instead.  · Cook with " heart-healthy oils, such as olive, canola, soybean, or sunflower oil.  Meal planning  · Eat a balanced diet that includes:  ? 5 or more servings of fruits and vegetables each day. At each meal, try to fill half of your plate with fruits and vegetables.  ? Up to 6-8 servings of whole grains each day.  ? Less than 6 oz of lean meat, poultry, or fish each day. A 3-oz serving of meat is about the same size as a deck of cards. One egg equals 1 oz.  ? 2 servings of low-fat dairy each day.  ? A serving of nuts, seeds, or beans 5 times each week.  ? Heart-healthy fats. Healthy fats called Omega-3 fatty acids are found in foods such as flaxseeds and coldwater fish, like sardines, salmon, and mackerel.  · Limit how much you eat of the following:  ? Canned or prepackaged foods.  ? Food that is high in trans fat, such as fried foods.  ? Food that is high in saturated fat, such as fatty meat.  ? Sweets, desserts, sugary drinks, and other foods with added sugar.  ? Full-fat dairy products.  · Do not salt foods before eating.  · Try to eat at least 2 vegetarian meals each week.  · Eat more home-cooked food and less restaurant, buffet, and fast food.  · When eating at a restaurant, ask that your food be prepared with less salt or no salt, if possible.  What foods are recommended?  The items listed may not be a complete list. Talk with your dietitian about what dietary choices are best for you.  Grains  Whole-grain or whole-wheat bread. Whole-grain or whole-wheat pasta. Brown rice. Oatmeal. Quinoa. Bulgur. Whole-grain and low-sodium cereals. Maureen bread. Low-fat, low-sodium crackers. Whole-wheat flour tortillas.  Vegetables  Fresh or frozen vegetables (raw, steamed, roasted, or grilled). Low-sodium or reduced-sodium tomato and vegetable juice. Low-sodium or reduced-sodium tomato sauce and tomato paste. Low-sodium or reduced-sodium canned vegetables.  Fruits  All fresh, dried, or frozen fruit. Canned fruit in natural juice (without  added sugar).  Meat and other protein foods  Skinless chicken or turkey. Ground chicken or turkey. Pork with fat trimmed off. Fish and seafood. Egg whites. Dried beans, peas, or lentils. Unsalted nuts, nut butters, and seeds. Unsalted canned beans. Lean cuts of beef with fat trimmed off. Low-sodium, lean deli meat.  Dairy  Low-fat (1%) or fat-free (skim) milk. Fat-free, low-fat, or reduced-fat cheeses. Nonfat, low-sodium ricotta or cottage cheese. Low-fat or nonfat yogurt. Low-fat, low-sodium cheese.  Fats and oils  Soft margarine without trans fats. Vegetable oil. Low-fat, reduced-fat, or light mayonnaise and salad dressings (reduced-sodium). Canola, safflower, olive, soybean, and sunflower oils. Avocado.  Seasoning and other foods  Herbs. Spices. Seasoning mixes without salt. Unsalted popcorn and pretzels. Fat-free sweets.  What foods are not recommended?  The items listed may not be a complete list. Talk with your dietitian about what dietary choices are best for you.  Grains  Baked goods made with fat, such as croissants, muffins, or some breads. Dry pasta or rice meal packs.  Vegetables  Creamed or fried vegetables. Vegetables in a cheese sauce. Regular canned vegetables (not low-sodium or reduced-sodium). Regular canned tomato sauce and paste (not low-sodium or reduced-sodium). Regular tomato and vegetable juice (not low-sodium or reduced-sodium). Pickles. Olives.  Fruits  Canned fruit in a light or heavy syrup. Fried fruit. Fruit in cream or butter sauce.  Meat and other protein foods  Fatty cuts of meat. Ribs. Fried meat. Rodgers. Sausage. Bologna and other processed lunch meats. Salami. Fatback. Hotdogs. Bratwurst. Salted nuts and seeds. Canned beans with added salt. Canned or smoked fish. Whole eggs or egg yolks. Chicken or turkey with skin.  Dairy  Whole or 2% milk, cream, and half-and-half. Whole or full-fat cream cheese. Whole-fat or sweetened yogurt. Full-fat cheese. Nondairy creamers. Whipped toppings.  Processed cheese and cheese spreads.  Fats and oils  Butter. Stick margarine. Lard. Shortening. Ghee. Rodgers fat. Tropical oils, such as coconut, palm kernel, or palm oil.  Seasoning and other foods  Salted popcorn and pretzels. Onion salt, garlic salt, seasoned salt, table salt, and sea salt. Worcestershire sauce. Tartar sauce. Barbecue sauce. Teriyaki sauce. Soy sauce, including reduced-sodium. Steak sauce. Canned and packaged gravies. Fish sauce. Oyster sauce. Cocktail sauce. Horseradish that you find on the shelf. Ketchup. Mustard. Meat flavorings and tenderizers. Bouillon cubes. Hot sauce and Tabasco sauce. Premade or packaged marinades. Premade or packaged taco seasonings. Relishes. Regular salad dressings.  Where to find more information:  · National Heart, Lung, and [employer] [place]: www.nhlbi.nih.gov  · American Heart Association: www.heart.org  Summary  · The DASH eating plan is a healthy eating plan that has been shown to reduce high blood pressure (hypertension). It may also reduce your risk for type 2 diabetes, heart disease, and stroke.  · With the DASH eating plan, you should limit salt (sodium) intake to 2,300 mg a day. If you have hypertension, you may need to reduce your sodium intake to 1,500 mg a day.  · When on the DASH eating plan, aim to eat more fresh fruits and vegetables, whole grains, lean proteins, low-fat dairy, and heart-healthy fats.  · Work with your health care provider or diet and nutrition specialist (dietitian) to adjust your eating plan to your individual calorie needs.  This information is not intended to replace advice given to you by your health care provider. Make sure you discuss any questions you have with your health care provider.  Document Revised: 11/30/2018 Document Reviewed: 12/11/2017  Elsevier Patient Education © 2020 Elsevier Inc.      Hypertension, Adult  High blood pressure (hypertension) is when the force of blood pumping through the arteries is too strong. The  "arteries are the blood vessels that carry blood from the heart throughout the body. Hypertension forces the heart to work harder to pump blood and may cause arteries to become narrow or stiff. Untreated or uncontrolled hypertension can cause a heart attack, heart failure, a stroke, kidney disease, and other problems.  A blood pressure reading consists of a higher number over a lower number. Ideally, your blood pressure should be below 120/80. The first (\"top\") number is called the systolic pressure. It is a measure of the pressure in your arteries as your heart beats. The second (\"bottom\") number is called the diastolic pressure. It is a measure of the pressure in your arteries as the heart relaxes.  What are the causes?  The exact cause of this condition is not known. There are some conditions that result in or are related to high blood pressure.  What increases the risk?  Some risk factors for high blood pressure are under your control. The following factors may make you more likely to develop this condition:  · Smoking.  · Having type 2 diabetes mellitus, high cholesterol, or both.  · Not getting enough exercise or physical activity.  · Being overweight.  · Having too much fat, sugar, calories, or salt (sodium) in your diet.  · Drinking too much alcohol.  Some risk factors for high blood pressure may be difficult or impossible to change. Some of these factors include:  · Having chronic kidney disease.  · Having a family history of high blood pressure.  · Age. Risk increases with age.  · Race. You may be at higher risk if you are .  · Gender. Men are at higher risk than women before age 45. After age 65, women are at higher risk than men.  · Having obstructive sleep apnea.  · Stress.  What are the signs or symptoms?  High blood pressure may not cause symptoms. Very high blood pressure (hypertensive crisis) may cause:  · Headache.  · Anxiety.  · Shortness of breath.  · Nosebleed.  · Nausea and " vomiting.  · Vision changes.  · Severe chest pain.  · Seizures.  How is this diagnosed?  This condition is diagnosed by measuring your blood pressure while you are seated, with your arm resting on a flat surface, your legs uncrossed, and your feet flat on the floor. The cuff of the blood pressure monitor will be placed directly against the skin of your upper arm at the level of your heart. It should be measured at least twice using the same arm. Certain conditions can cause a difference in blood pressure between your right and left arms.  Certain factors can cause blood pressure readings to be lower or higher than normal for a short period of time:  · When your blood pressure is higher when you are in a health care provider's office than when you are at home, this is called white coat hypertension. Most people with this condition do not need medicines.  · When your blood pressure is higher at home than when you are in a health care provider's office, this is called masked hypertension. Most people with this condition may need medicines to control blood pressure.  If you have a high blood pressure reading during one visit or you have normal blood pressure with other risk factors, you may be asked to:  · Return on a different day to have your blood pressure checked again.  · Monitor your blood pressure at home for 1 week or longer.  If you are diagnosed with hypertension, you may have other blood or imaging tests to help your health care provider understand your overall risk for other conditions.  How is this treated?  This condition is treated by making healthy lifestyle changes, such as eating healthy foods, exercising more, and reducing your alcohol intake. Your health care provider may prescribe medicine if lifestyle changes are not enough to get your blood pressure under control, and if:  · Your systolic blood pressure is above 130.  · Your diastolic blood pressure is above 80.  Your personal target blood  pressure may vary depending on your medical conditions, your age, and other factors.  Follow these instructions at home:  Eating and drinking    · Eat a diet that is high in fiber and potassium, and low in sodium, added sugar, and fat. An example eating plan is called the DASH (Dietary Approaches to Stop Hypertension) diet. To eat this way:  ? Eat plenty of fresh fruits and vegetables. Try to fill one half of your plate at each meal with fruits and vegetables.  ? Eat whole grains, such as whole-wheat pasta, brown rice, or whole-grain bread. Fill about one fourth of your plate with whole grains.  ? Eat or drink low-fat dairy products, such as skim milk or low-fat yogurt.  ? Avoid fatty cuts of meat, processed or cured meats, and poultry with skin. Fill about one fourth of your plate with lean proteins, such as fish, chicken without skin, beans, eggs, or tofu.  ? Avoid pre-made and processed foods. These tend to be higher in sodium, added sugar, and fat.  · Reduce your daily sodium intake. Most people with hypertension should eat less than 1,500 mg of sodium a day.  · Do not drink alcohol if:  ? Your health care provider tells you not to drink.  ? You are pregnant, may be pregnant, or are planning to become pregnant.  · If you drink alcohol:  ? Limit how much you use to:  § 0-1 drink a day for women.  § 0-2 drinks a day for men.  ? Be aware of how much alcohol is in your drink. In the [place] one drink equals one 12 oz bottle of beer (355 mL), one 5 oz glass of wine (148 mL), or one 1½ oz glass of hard liquor (44 mL).  Lifestyle    · Work with your health care provider to maintain a healthy body weight or to lose weight. Ask what an ideal weight is for you.  · Get at least 30 minutes of exercise most days of the week. Activities may include walking, swimming, or biking.  · Include exercise to strengthen your muscles (resistance exercise), such as Pilates or lifting weights, as part of your weekly exercise routine. Try  to do these types of exercises for 30 minutes at least 3 days a week.  · Do not use any products that contain nicotine or tobacco, such as cigarettes, e-cigarettes, and chewing tobacco. If you need help quitting, ask your health care provider.  · Monitor your blood pressure at home as told by your health care provider.  · Keep all follow-up visits as told by your health care provider. This is important.  Medicines  · Take over-the-counter and prescription medicines only as told by your health care provider. Follow directions carefully. Blood pressure medicines must be taken as prescribed.  · Do not skip doses of blood pressure medicine. Doing this puts you at risk for problems and can make the medicine less effective.  · Ask your health care provider about side effects or reactions to medicines that you should watch for.  Contact a health care provider if you:  · Think you are having a reaction to a medicine you are taking.  · Have headaches that keep coming back (recurring).  · Feel dizzy.  · Have swelling in your ankles.  · Have trouble with your vision.  Get help right away if you:  · Develop a severe headache or confusion.  · Have unusual weakness or numbness.  · Feel faint.  · Have severe pain in your chest or abdomen.  · Vomit repeatedly.  · Have trouble breathing.  Summary  · Hypertension is when the force of blood pumping through your arteries is too strong. If this condition is not controlled, it may put you at risk for serious complications.  · Your personal target blood pressure may vary depending on your medical conditions, your age, and other factors. For most people, a normal blood pressure is less than 120/80.  · Hypertension is treated with lifestyle changes, medicines, or a combination of both. Lifestyle changes include losing weight, eating a healthy, low-sodium diet, exercising more, and limiting alcohol.  This information is not intended to replace advice given to you by your health care  provider. Make sure you discuss any questions you have with your health care provider.  Document Revised: 08/28/2019 Document Reviewed: 08/28/2019  Elsevier Patient Education © 2020 Elsevier Inc.

## 2021-03-08 NOTE — PROGRESS NOTES
"Chief Complaint  Hypertension    Subjective          Luis Enrique Aldana presents to [facility]  Patient here today to follow up on HTN.  He is running in the 140s here but 130s at home.    I need those home readings.      Hypertension  This is a recurrent problem. The current episode started more than 1 year ago. The problem has been gradually improving since onset. The problem is controlled. Pertinent negatives include no anxiety, blurred vision, chest pain, headaches, malaise/fatigue, neck pain, orthopnea, palpitations, peripheral edema, PND, shortness of breath or sweats. There are no associated agents to hypertension. Risk factors for coronary artery disease include family history. There are no compliance problems.        Objective   Vital Signs:   /98 (BP Location: Right arm)   Pulse 95   Temp 96.9 °F (36.1 °C) (Temporal)   Resp 16   Ht 182.9 cm (72\")   Wt 108 kg (239 lb)   SpO2 97%   BMI 32.41 kg/m²     Physical Exam  Constitutional:       Appearance: Normal appearance. He is well-developed and normal weight.   HENT:      Head: Normocephalic and atraumatic.      Right Ear: Tympanic membrane, ear canal and external ear normal.      Left Ear: Tympanic membrane, ear canal and external ear normal.      Nose: Nose normal.      Mouth/Throat:      Mouth: Mucous membranes are moist.      Pharynx: Oropharynx is clear. No oropharyngeal exudate.   Eyes:      Extraocular Movements: Extraocular movements intact.      Conjunctiva/sclera: Conjunctivae normal.      Pupils: Pupils are equal, round, and reactive to light.   Cardiovascular:      Rate and Rhythm: Normal rate and regular rhythm.      Pulses: Normal pulses.      Heart sounds: Normal heart sounds.   Pulmonary:      Effort: Pulmonary effort is normal.      Breath sounds: Normal breath sounds.   Abdominal:      General: Bowel sounds are normal.      Palpations: Abdomen is soft.   Musculoskeletal:         General: Normal range " of motion.      Cervical back: Normal range of motion and neck supple.   Skin:     General: Skin is warm and dry.   Neurological:      General: No focal deficit present.      Mental Status: He is alert and oriented to person, place, and time. Mental status is at baseline.   Psychiatric:         Mood and Affect: Mood normal.         Behavior: Behavior normal.         Thought Content: Thought content normal.         Judgment: Judgment normal.        Result Review :                 Assessment and Plan    Diagnoses and all orders for this visit:    1. Essential hypertension (Primary)  Assessment & Plan:  Hypertension is improving with treatment.  Blood pressure will be reassessed in 3 months.    Patient doing well on Atenolol and dyazide.        Follow Up   Return in about 6 months (around 9/8/2021) for Recheck.  Patient was given instructions and counseling regarding his condition or for health maintenance advice. Please see specific information pulled into the AVS if appropriate.       Answers for HPI/ROS submitted by the patient on 3/6/2021  What is the primary reason for your visit?: High Blood Pressure

## 2021-03-22 RX ORDER — LISINOPRIL 10 MG/1
10 TABLET ORAL DAILY
Qty: 90 TABLET | Refills: 3 | Status: SHIPPED | OUTPATIENT
Start: 2021-03-22 | End: 2022-03-11 | Stop reason: SDUPTHER

## 2021-03-22 RX ORDER — ERGOCALCIFEROL 1.25 MG/1
CAPSULE ORAL
Qty: 13 CAPSULE | Refills: 1 | Status: SHIPPED | OUTPATIENT
Start: 2021-03-22 | End: 2021-09-10

## 2021-03-24 RX ORDER — TRIAMTERENE AND HYDROCHLOROTHIAZIDE 37.5; 25 MG/1; MG/1
CAPSULE ORAL
Qty: 90 CAPSULE | Refills: 3 | Status: SHIPPED | OUTPATIENT
Start: 2021-03-24 | End: 2022-02-28

## 2021-09-10 RX ORDER — ERGOCALCIFEROL 1.25 MG/1
CAPSULE ORAL
Qty: 13 CAPSULE | Refills: 1 | Status: SHIPPED | OUTPATIENT
Start: 2021-09-10 | End: 2022-02-22

## 2021-10-19 RX ORDER — ATENOLOL 50 MG/1
50 TABLET ORAL 2 TIMES DAILY
Qty: 180 TABLET | Refills: 2 | Status: SHIPPED | OUTPATIENT
Start: 2021-10-19 | End: 2022-08-01

## 2021-10-19 NOTE — TELEPHONE ENCOUNTER
Caller: Luis Enrique Aldana    Relationship: Self      Medication requested (name and dosage):atenolol (Tenormin) 50 MG tablet (  Requested Prescriptions:   Requested Prescriptions     Pending Prescriptions Disp Refills   • atenolol (Tenormin) 50 MG tablet 180 tablet 2     Sig: Take 1 tablet by mouth 2 (two) times a day for 90 days.        Pharmacy where request should be sent: [facility] - [address] - [phone] [facility] [phone] [phone]    Additional details provided by patient: 90 DAY SUPPLY REQUESTED, 1 WEEK OF MEDICATION REMAINING      Best call back number:[phone]     Does the patient have less than a 3 day supply:  [] Yes  [x] No    Bertrand De La Torre   10/19/21 11:46 EDT

## 2022-02-22 RX ORDER — ERGOCALCIFEROL 1.25 MG/1
CAPSULE ORAL
Qty: 13 CAPSULE | Refills: 0 | Status: SHIPPED | OUTPATIENT
Start: 2022-02-22 | End: 2022-09-21

## 2022-02-28 RX ORDER — TRIAMTERENE AND HYDROCHLOROTHIAZIDE 37.5; 25 MG/1; MG/1
CAPSULE ORAL
Qty: 90 CAPSULE | Refills: 3 | Status: SHIPPED | OUTPATIENT
Start: 2022-02-28 | End: 2022-11-11 | Stop reason: SDUPTHER

## 2022-03-04 DIAGNOSIS — R73.09 ELEVATED HEMOGLOBIN A1C: ICD-10-CM

## 2022-03-04 DIAGNOSIS — Z12.5 SCREENING FOR PROSTATE CANCER: ICD-10-CM

## 2022-03-04 DIAGNOSIS — E55.9 VITAMIN D DEFICIENCY: Primary | ICD-10-CM

## 2022-03-04 DIAGNOSIS — I10 ESSENTIAL HYPERTENSION: ICD-10-CM

## 2022-03-04 DIAGNOSIS — E78.2 MIXED HYPERLIPIDEMIA: ICD-10-CM

## 2022-03-07 ENCOUNTER — LAB (OUTPATIENT)
Dept: FAMILY MEDICINE CLINIC | Facility: CLINIC | Age: 72
End: 2022-03-07

## 2022-03-07 DIAGNOSIS — Z12.5 SCREENING FOR PROSTATE CANCER: ICD-10-CM

## 2022-03-07 DIAGNOSIS — E78.2 MIXED HYPERLIPIDEMIA: ICD-10-CM

## 2022-03-07 DIAGNOSIS — R73.09 ELEVATED HEMOGLOBIN A1C: ICD-10-CM

## 2022-03-07 DIAGNOSIS — I10 ESSENTIAL HYPERTENSION: ICD-10-CM

## 2022-03-07 DIAGNOSIS — E55.9 VITAMIN D DEFICIENCY: ICD-10-CM

## 2022-03-07 LAB
25(OH)D3 SERPL-MCNC: 42.2 NG/ML (ref 30–100)
ALBUMIN SERPL-MCNC: 4.7 G/DL (ref 3.5–5.2)
ALBUMIN/GLOB SERPL: 1.9 G/DL
ALP SERPL-CCNC: 46 U/L (ref 39–117)
ALT SERPL W P-5'-P-CCNC: 15 U/L (ref 1–41)
ANION GAP SERPL CALCULATED.3IONS-SCNC: 10.2 MMOL/L (ref 5–15)
AST SERPL-CCNC: 20 U/L (ref 1–40)
BASOPHILS # BLD AUTO: 0.08 10*3/MM3 (ref 0–0.2)
BASOPHILS NFR BLD AUTO: 1.2 % (ref 0–1.5)
BILIRUB SERPL-MCNC: 0.7 MG/DL (ref 0–1.2)
BILIRUB UR QL STRIP: NEGATIVE
BUN SERPL-MCNC: 18 MG/DL (ref 8–23)
BUN/CREAT SERPL: 14 (ref 7–25)
CALCIUM SPEC-SCNC: 9.5 MG/DL (ref 8.6–10.5)
CHLORIDE SERPL-SCNC: 101 MMOL/L (ref 98–107)
CHOLEST SERPL-MCNC: 207 MG/DL (ref 0–200)
CLARITY UR: CLEAR
CO2 SERPL-SCNC: 29.8 MMOL/L (ref 22–29)
COLOR UR: YELLOW
CREAT SERPL-MCNC: 1.29 MG/DL (ref 0.76–1.27)
DEPRECATED RDW RBC AUTO: 41.1 FL (ref 37–54)
EGFRCR SERPLBLD CKD-EPI 2021: 59.6 ML/MIN/1.73
EOSINOPHIL # BLD AUTO: 0.32 10*3/MM3 (ref 0–0.4)
EOSINOPHIL NFR BLD AUTO: 4.8 % (ref 0.3–6.2)
ERYTHROCYTE [DISTWIDTH] IN BLOOD BY AUTOMATED COUNT: 13.4 % (ref 12.3–15.4)
GLOBULIN UR ELPH-MCNC: 2.5 GM/DL
GLUCOSE SERPL-MCNC: 99 MG/DL (ref 65–99)
GLUCOSE UR STRIP-MCNC: NEGATIVE MG/DL
HBA1C MFR BLD: 5.8 % (ref 3.5–5.6)
HCT VFR BLD AUTO: 42.8 % (ref 37.5–51)
HDLC SERPL-MCNC: 47 MG/DL (ref 40–60)
HGB BLD-MCNC: 14.3 G/DL (ref 13–17.7)
HGB UR QL STRIP.AUTO: NEGATIVE
IMM GRANULOCYTES # BLD AUTO: 0.02 10*3/MM3 (ref 0–0.05)
IMM GRANULOCYTES NFR BLD AUTO: 0.3 % (ref 0–0.5)
KETONES UR QL STRIP: NEGATIVE
LDLC SERPL CALC-MCNC: 134 MG/DL (ref 0–100)
LDLC/HDLC SERPL: 2.78 {RATIO}
LEUKOCYTE ESTERASE UR QL STRIP.AUTO: NEGATIVE
LYMPHOCYTES # BLD AUTO: 1.72 10*3/MM3 (ref 0.7–3.1)
LYMPHOCYTES NFR BLD AUTO: 25.7 % (ref 19.6–45.3)
MCH RBC QN AUTO: 28.9 PG (ref 26.6–33)
MCHC RBC AUTO-ENTMCNC: 33.4 G/DL (ref 31.5–35.7)
MCV RBC AUTO: 86.5 FL (ref 79–97)
MONOCYTES # BLD AUTO: 0.72 10*3/MM3 (ref 0.1–0.9)
MONOCYTES NFR BLD AUTO: 10.8 % (ref 5–12)
NEUTROPHILS NFR BLD AUTO: 3.82 10*3/MM3 (ref 1.7–7)
NEUTROPHILS NFR BLD AUTO: 57.2 % (ref 42.7–76)
NITRITE UR QL STRIP: NEGATIVE
NRBC BLD AUTO-RTO: 0 /100 WBC (ref 0–0.2)
PH UR STRIP.AUTO: 7.5 [PH] (ref 5–8)
PLATELET # BLD AUTO: 292 10*3/MM3 (ref 140–450)
PMV BLD AUTO: 9.6 FL (ref 6–12)
POTASSIUM SERPL-SCNC: 4.2 MMOL/L (ref 3.5–5.2)
PROT SERPL-MCNC: 7.2 G/DL (ref 6–8.5)
PROT UR QL STRIP: ABNORMAL
PSA SERPL-MCNC: 1.48 NG/ML (ref 0–4)
RBC # BLD AUTO: 4.95 10*6/MM3 (ref 4.14–5.8)
SODIUM SERPL-SCNC: 141 MMOL/L (ref 136–145)
SP GR UR STRIP: 1.02 (ref 1–1.03)
TRIGL SERPL-MCNC: 147 MG/DL (ref 0–150)
TSH SERPL DL<=0.05 MIU/L-ACNC: 2.75 UIU/ML (ref 0.27–4.2)
UROBILINOGEN UR QL STRIP: ABNORMAL
VLDLC SERPL-MCNC: 26 MG/DL (ref 5–40)
WBC NRBC COR # BLD: 6.68 10*3/MM3 (ref 3.4–10.8)

## 2022-03-07 PROCEDURE — 80053 COMPREHEN METABOLIC PANEL: CPT | Performed by: FAMILY MEDICINE

## 2022-03-07 PROCEDURE — G0103 PSA SCREENING: HCPCS | Performed by: FAMILY MEDICINE

## 2022-03-07 PROCEDURE — 84443 ASSAY THYROID STIM HORMONE: CPT | Performed by: FAMILY MEDICINE

## 2022-03-07 PROCEDURE — 85025 COMPLETE CBC W/AUTO DIFF WBC: CPT | Performed by: FAMILY MEDICINE

## 2022-03-07 PROCEDURE — 83036 HEMOGLOBIN GLYCOSYLATED A1C: CPT | Performed by: FAMILY MEDICINE

## 2022-03-07 PROCEDURE — 80061 LIPID PANEL: CPT | Performed by: FAMILY MEDICINE

## 2022-03-07 PROCEDURE — 82306 VITAMIN D 25 HYDROXY: CPT | Performed by: FAMILY MEDICINE

## 2022-03-07 PROCEDURE — 81003 URINALYSIS AUTO W/O SCOPE: CPT | Performed by: FAMILY MEDICINE

## 2022-03-07 PROCEDURE — 36415 COLL VENOUS BLD VENIPUNCTURE: CPT

## 2022-03-08 ENCOUNTER — OFFICE VISIT (OUTPATIENT)
Dept: FAMILY MEDICINE CLINIC | Facility: CLINIC | Age: 72
End: 2022-03-08

## 2022-03-08 VITALS
WEIGHT: 243 LBS | TEMPERATURE: 96.8 F | BODY MASS INDEX: 32.91 KG/M2 | HEIGHT: 72 IN | SYSTOLIC BLOOD PRESSURE: 146 MMHG | RESPIRATION RATE: 17 BRPM | HEART RATE: 62 BPM | OXYGEN SATURATION: 98 % | DIASTOLIC BLOOD PRESSURE: 90 MMHG

## 2022-03-08 DIAGNOSIS — M54.32 LEFT SIDED SCIATICA: Primary | ICD-10-CM

## 2022-03-08 DIAGNOSIS — S39.012A STRAIN OF LUMBAR PARASPINOUS MUSCLE, INITIAL ENCOUNTER: ICD-10-CM

## 2022-03-08 PROCEDURE — 99213 OFFICE O/P EST LOW 20 MIN: CPT | Performed by: NURSE PRACTITIONER

## 2022-03-08 RX ORDER — TIZANIDINE 4 MG/1
4 TABLET ORAL EVERY 8 HOURS PRN
Qty: 30 TABLET | Refills: 0 | Status: SHIPPED | OUTPATIENT
Start: 2022-03-08 | End: 2022-03-29 | Stop reason: SDUPTHER

## 2022-03-08 RX ORDER — PREDNISONE 20 MG/1
TABLET ORAL
Qty: 18 TABLET | Refills: 0 | OUTPATIENT
Start: 2022-03-08 | End: 2022-10-25

## 2022-03-08 NOTE — PROGRESS NOTES
"Chief Complaint  Leg Pain  Subjective        Luis Enrique Aldana presents to [facility]  Pt comes in today with c/o lower back pain and radiating down left left. Started on Saturday after working on a deck for 3 days. States he \"over did it\".  Was having some numbness and tingling in leg, burning in leg. Seems to be improving, but still with some milder pain. States he was altering his gait and now with left hip pain and pain in upper thigh.   Taking advil otc w/o any relief. Has tried heat and that has helped.        Objective     Vital Signs:   /90   Pulse 62   Temp 96.8 °F (36 °C)   Resp 17   Ht 182.9 cm (72\")   Wt 110 kg (243 lb)   SpO2 98%   BMI 32.96 kg/m²       BP Readings from Last 3 Encounters:   03/08/22 146/90   03/08/21 150/98   12/07/20 150/100       Wt Readings from Last 3 Encounters:   03/08/22 110 kg (243 lb)   03/08/21 108 kg (239 lb)   12/07/20 106 kg (234 lb)     Physical Exam  Constitutional:       Appearance: He is well-developed.   Eyes:      Pupils: Pupils are equal, round, and reactive to light.   Cardiovascular:      Rate and Rhythm: Normal rate and regular rhythm.   Pulmonary:      Effort: Pulmonary effort is normal.      Breath sounds: Normal breath sounds.   Musculoskeletal:      Lumbar back: Tenderness present. Normal range of motion. Negative right straight leg raise test and negative left straight leg raise test.      Comments: SI tenderness on left    Neurological:      Mental Status: He is alert and oriented to person, place, and time.        Result Review :                 Assessment and Plan    Diagnoses and all orders for this visit:    1. Left sided sciatica (Primary)  -     predniSONE (DELTASONE) 20 MG tablet; 3 po x 3 days, 2 po x 3 days, 1 po x 3 days  Dispense: 18 tablet; Refill: 0  -     tiZANidine (Zanaflex) 4 MG tablet; Take 1 tablet by mouth Every 8 (Eight) Hours As Needed for Muscle Spasms.  Dispense: 30 tablet; Refill: 0    2. " Strain of lumbar paraspinous muscle, initial encounter  -     predniSONE (DELTASONE) 20 MG tablet; 3 po x 3 days, 2 po x 3 days, 1 po x 3 days  Dispense: 18 tablet; Refill: 0  -     tiZANidine (Zanaflex) 4 MG tablet; Take 1 tablet by mouth Every 8 (Eight) Hours As Needed for Muscle Spasms.  Dispense: 30 tablet; Refill: 0    prednisone taper  zanaflex prn at night  If no improvement will get imaging and consider PT  During this office visit, we discussed the pertinent aspects of the visit and treatment recommendations. Pt verbalizes understanding. Follow up was discussed. Patient was given the opportunity to ask questions and discuss other concerns.         Follow Up   No follow-ups on file.  Patient was given instructions and counseling regarding his condition or for health maintenance advice. Please see specific information pulled into the AVS if appropriate.

## 2022-03-11 ENCOUNTER — OFFICE VISIT (OUTPATIENT)
Dept: FAMILY MEDICINE CLINIC | Facility: CLINIC | Age: 72
End: 2022-03-11

## 2022-03-11 ENCOUNTER — TELEPHONE (OUTPATIENT)
Dept: FAMILY MEDICINE CLINIC | Facility: CLINIC | Age: 72
End: 2022-03-11

## 2022-03-11 VITALS
DIASTOLIC BLOOD PRESSURE: 90 MMHG | TEMPERATURE: 97.9 F | SYSTOLIC BLOOD PRESSURE: 124 MMHG | BODY MASS INDEX: 32.78 KG/M2 | OXYGEN SATURATION: 99 % | WEIGHT: 242 LBS | HEIGHT: 72 IN | RESPIRATION RATE: 16 BRPM | HEART RATE: 68 BPM

## 2022-03-11 DIAGNOSIS — Z23 IMMUNIZATION DUE: ICD-10-CM

## 2022-03-11 DIAGNOSIS — Z00.00 MEDICARE ANNUAL WELLNESS VISIT, SUBSEQUENT: Primary | ICD-10-CM

## 2022-03-11 DIAGNOSIS — I10 PRIMARY HYPERTENSION: ICD-10-CM

## 2022-03-11 DIAGNOSIS — E78.2 MIXED HYPERLIPIDEMIA: ICD-10-CM

## 2022-03-11 PROCEDURE — 99213 OFFICE O/P EST LOW 20 MIN: CPT | Performed by: FAMILY MEDICINE

## 2022-03-11 PROCEDURE — 1160F RVW MEDS BY RX/DR IN RCRD: CPT | Performed by: FAMILY MEDICINE

## 2022-03-11 PROCEDURE — 90732 PPSV23 VACC 2 YRS+ SUBQ/IM: CPT | Performed by: FAMILY MEDICINE

## 2022-03-11 PROCEDURE — 1170F FXNL STATUS ASSESSED: CPT | Performed by: FAMILY MEDICINE

## 2022-03-11 PROCEDURE — G0439 PPPS, SUBSEQ VISIT: HCPCS | Performed by: FAMILY MEDICINE

## 2022-03-11 PROCEDURE — G0009 ADMIN PNEUMOCOCCAL VACCINE: HCPCS | Performed by: FAMILY MEDICINE

## 2022-03-11 RX ORDER — LISINOPRIL 20 MG/1
20 TABLET ORAL DAILY
Qty: 90 TABLET | Refills: 3 | Status: SHIPPED | OUTPATIENT
Start: 2022-03-11 | End: 2022-05-11

## 2022-03-11 NOTE — TELEPHONE ENCOUNTER
Caller: Luis Enrique Aldana S    Relationship to patient: Self    Best call back number: [phone]    Patient is needing: PATIENT CALLED AND SAID THAT HE SAW DR. BURRELL TODAY AND HE WAS GOING TO GIVE HIM SOME EXCISES FOR HIM TO DO AT HOME, BUT HE DIDN'T GET THEM    HE IS WANTING TO SEE IF THEY CAN BE E-MAILED TO HIM OR BE MAILED     E-MAIL: [email or web address]    ADDRESS: [address] [place] [id number]

## 2022-03-11 NOTE — PROGRESS NOTES
The ABCs of the Annual Wellness Visit  Subsequent Medicare Wellness Visit    Chief Complaint   Patient presents with   • Medicare Wellness-subsequent      Subjective    History of Present Illness:  Luis Enrique Aldana is a 71 y.o. male who presents for a Subsequent Medicare Wellness Visit.  Upon arrival to the room the patient underwent the Medicare health risk assessment.  Neither the questions themselves or the answers that were given prompted any major concern on the part of the patient or by the medical staff that gave the assessment.  As far as the preventative care examinations and the preventative care immunizations that this patient requires they are as listed below.     The patient states that he is doing well overall. He reports that he was seen by a nurse practitioner on 03/08/2022 for sciatica. He notes that he was prescribed prednisone and a muscle relaxer, and reports it to be resolving. He denies any concern at this time.    The patient states that he is up to date on his colonoscopy. He denies being testosterone deficient when he was younger, and also denies taking steroids in those years. The patient states that he gets his eyes examined every year, and denies any glaucoma. He states that he sees his dentist every 3 months. The patient states that he is still exercising some, but he admits to still having carbs in his diet. He also notes that he tries to stay away from sugars and salts. He states that he does not eat too much of anything. The patient did recently gain 5 pounds.     The patient points out his hip as an area of pain intermittently. He makes note that it takes him time to get out of bed, and he has some pain when walking at times. He is attributing the pain to years of wear and tear on the body from his work. He says that it seems to be getting better. The patient denies having surgery on his hip. He states that he is sleeping better at night    He notes that his blood pressure has been  elevated at home sometimes in the mid 90s, which is unusual for him. He said that it has been like this for the last 2 months. He has tried being more consistent with workouts but the readings did not budge.    He states that he doubled up on the atenolol taking it  morning and afternoon. The patient is also on lisinopril, and triamterene.    He says he has had all 3 COVID vaccines.     Screening tests recommended:    Colonoscopy UTD  PSA UTD  Eye- utd  Dentist-utd      Immunization:  Influenza UTD  Prevnar UTD  Pneumovax UTD-give today  Tetanus UTD  Shingles vaccine-Get in 2023  Hepatitis   Covid   UTD                 The following portions of the patient's history were reviewed and   updated as appropriate: allergies, current medications, past family history, past medical history, past social history, past surgical history and problem list.    Compared to one year ago, the patient feels his physical   health is the same.    Compared to one year ago, the patient feels his mental   health is the same.    Recent Hospitalizations:  He was not admitted to the hospital during the last year.       Current Medical Providers:  Patient Care Team:  Agustin Fonseca MD as PCP - General (Family Medicine)    Outpatient Medications Prior to Visit   Medication Sig Dispense Refill   • aspirin 81 MG EC tablet Take 81 mg by mouth Daily.     • predniSONE (DELTASONE) 20 MG tablet 3 po x 3 days, 2 po x 3 days, 1 po x 3 days 18 tablet 0   • sildenafil (VIAGRA) 100 MG tablet VIAGRA 100 MG TABS     • tiZANidine (Zanaflex) 4 MG tablet Take 1 tablet by mouth Every 8 (Eight) Hours As Needed for Muscle Spasms. 30 tablet 0   • triamterene-hydrochlorothiazide (DYAZIDE) 37.5-25 MG per capsule TAKE 1 CAPSULE DAILY 90 capsule 3   • vitamin D (ERGOCALCIFEROL) 1.25 MG (82779 UT) capsule capsule TAKE 1 CAPSULE BY MOUTH WEEKLY 13 capsule 0   • atenolol (Tenormin) 50 MG tablet Take 1 tablet by mouth 2 (two) times a day for 90 days. 180 tablet 2   •  "lisinopril (PRINIVIL,ZESTRIL) 10 MG tablet Take 1 tablet by mouth Daily for 90 days. 90 tablet 3     No facility-administered medications prior to visit.       No opioid medication identified on active medication list. I have reviewed chart for other potential  high risk medication/s and harmful drug interactions in the elderly.          Aspirin is on active medication list. Aspirin use is indicated based on review of current medical condition/s. Pros and cons of this therapy have been discussed today. Benefits of this medication outweigh potential harm.  Patient has been encouraged to continue taking this medication.  .      Patient Active Problem List   Diagnosis   • Hyperlipidemia   • Hypertension   • Vitamin D deficiency   • Medicare annual wellness visit, subsequent     Advance Care Planning  Advance Directive is not on file.  ACP discussion was held with the patient during this visit. Patient has an advance directive (not in EMR), copy requested.          Objective    Vitals:    03/11/22 1433   BP: 124/90   BP Location: Right arm   Pulse: 68   Resp: 16   Temp: 97.9 °F (36.6 °C)   TempSrc: Infrared   SpO2: 99%   Weight: 110 kg (242 lb)   Height: 182.9 cm (72\")     BMI Readings from Last 1 Encounters:   03/11/22 32.82 kg/m²   BMI is above normal parameters. Recommendations include: exercise counseling and nutrition counseling    Does the patient have evidence of cognitive impairment? No    Physical Exam  Lab Results   Component Value Date    TRIG 147 03/07/2022    HDL 47 03/07/2022     (H) 03/07/2022    VLDL 26 03/07/2022    HGBA1C 5.8 (H) 03/07/2022            HEALTH RISK ASSESSMENT    Smoking Status:  Social History     Tobacco Use   Smoking Status Never Smoker   Smokeless Tobacco Never Used     Alcohol Consumption:  Social History     Substance and Sexual Activity   Alcohol Use Yes    Comment: rarely     Fall Risk Screen:    STEADI Fall Risk Assessment was completed, and patient is at LOW risk for " falls.Assessment completed on:3/8/2022    Depression Screening:  PHQ-2/PHQ-9 Depression Screening 3/8/2022   Retired Total Score -   Little Interest or Pleasure in Doing Things 0-->not at all   Feeling Down, Depressed or Hopeless 0-->not at all   PHQ-9: Brief Depression Severity Measure Score 0       Health Habits and Functional and Cognitive Screening:  Functional & Cognitive Status 3/11/2022   Do you have difficulty preparing food and eating? No   Do you have difficulty bathing yourself, getting dressed or grooming yourself? No   Do you have difficulty using the toilet? No   Do you have difficulty moving around from place to place? No   Do you have trouble with steps or getting out of a bed or a chair? No   Current Diet Well Balanced Diet   Dental Exam Up to date   Eye Exam Up to date   Exercise (times per week) 4 times per week   Current Exercises Include Cardiovascular Workout   Do you need help using the phone?  No   Are you deaf or do you have serious difficulty hearing?  No   Do you need help with transportation? No   Do you need help shopping? No   Do you need help preparing meals?  No   Do you need help with housework?  No   Do you need help with laundry? No   Do you need help taking your medications? No   Do you need help managing money? No   Do you ever drive or ride in a car without wearing a seat belt? No   Have you felt unusual stress, anger or loneliness in the last month? No   Who do you live with? Spouse   If you need help, do you have trouble finding someone available to you? No   Do you have difficulty concentrating, remembering or making decisions? No       Age-appropriate Screening Schedule:  Refer to the list below for future screening recommendations based on patient's age, sex and/or medical conditions. Orders for these recommended tests are listed in the plan section. The patient has been provided with a written plan.    Health Maintenance   Topic Date Due   • ZOSTER VACCINE (2 of 3)  03/08/2013   • LIPID PANEL  03/07/2023   • TDAP/TD VACCINES (2 - Td or Tdap) 12/07/2030   • INFLUENZA VACCINE  Completed              Assessment/Plan   CMS Preventative Services Quick Reference  Risk Factors Identified During Encounter  Immunizations Discussed/Encouraged (specific Immunizations; Pneumococcal 23  Inactivity/Sedentary  Obesity/Overweight   The above risks/problems have been discussed with the patient.  Follow up actions/plans if indicated are seen below in the Assessment/Plan Section.  Pertinent information has been shared with the patient in the After Visit Summary.    Diagnoses and all orders for this visit:    1. Medicare annual wellness visit, subsequent (Primary)        -Upon arrival to the room the patient underwent the Medicare health risk assessment.  Neither the questions themselves or the answers that were given prompted any major concern on the part of the patient or by the medical staff that gave the assessment.  As far as the preventative care examinations and the preventative care immunizations that this patient requires they are as listed below.   Screening tests recommended:    Colonoscopy UTD  PSA UTD  Eye- utd  Dentist-utd      Immunization:  Influenza UTD  Prevnar UTD  Pneumovax UTD-give today  Tetanus UTD  Shingles vaccine-Get in 2023  Hepatitis   Covid   UTD                    2. Hyperlipidemia        - The patient's lipid panel today shows slight elevation of his total cholesterol, and his LDL cholesterol.         - His HDL and triglycerides were pretty normal. I encouraged him to follow a low carb diet. He is going to exercise, and aim to loose 10 pounds over the next 6 months.         - We are going to repeat his lipid panel. If by chance his lipids do stay elevated despite diet, exercise, and weight loss, we may have to consider adding in a statin within the next year or so.    3. Primary hypertension        - The patient's blood pressure in the office was reasonable, but he  is getting some high readings at home.         - I have asked him to increase his Prinivil from 10 mg to 20 mg.         - The patient will also stay on atenolol 50 mg twice a day, along with Dyazide (triamterene/hydrochlorothiazide) one capsule a day.         - If for some reason the blood pressure does not come down as expected over the next month, we may have to increase the lisinopril again up to 40 mg.    Follow Up:   Return in about 6 months (around 9/11/2022), or if symptoms worsen or fail to improve, for Recheck.     An After Visit Summary and PPPS were made available to the patient.    Transcribed from ambient dictation for Agustin Fonseca MD by Benny Starr.  03/12/22   11:11 EST    Patient verbalized consent to the visit recording.  I have personally performed the services described in this document as transcribed by the above individual, and it is both accurate and complete.  Agustin Fonseca MD  3/14/2022  11:27 EDT

## 2022-03-14 NOTE — PATIENT INSTRUCTIONS
Medicare Wellness  Personal Prevention Plan of Service     Date of Office Visit:    Encounter Provider:  Agustin Fonseca MD  Place of Service:  [facility]  Patient Name: Luis Enrique Aldana  :  1950    As part of the Medicare Wellness portion of your visit today, we are providing you with this personalized preventive plan of services (PPPS). This plan is based upon recommendations of the [place] Preventive Services Task Force (USPSTF) and the Advisory Committee on Immunization Practices (ACIP).    This lists the preventive care services that should be considered, and provides dates of when you are due. Items listed as completed are up-to-date and do not require any further intervention.    Health Maintenance   Topic Date Due    ZOSTER VACCINE (2 of 3) 2013    HEPATITIS C SCREENING  Never done    ANNUAL WELLNESS VISIT  2021    LIPID PANEL  2023    COLORECTAL CANCER SCREENING  2027    TDAP/TD VACCINES (2 - Td or Tdap) 2030    COVID-19 Vaccine  Completed    INFLUENZA VACCINE  Completed    Pneumococcal Vaccine 65+  Completed       Orders Placed This Encounter   Procedures    Pneumococcal Polysaccharide Vaccine 23-Valent (PPSV23) Greater Than or Equal To 3yo Subcutaneous / IM       Return in about 6 months (around 2022), or if symptoms worsen or fail to improve, for Recheck.

## 2022-03-17 RX ORDER — LISINOPRIL 10 MG/1
TABLET ORAL
Qty: 90 TABLET | Refills: 3 | Status: SHIPPED | OUTPATIENT
Start: 2022-03-17 | End: 2022-05-11

## 2022-03-20 DIAGNOSIS — M51.06 LUMBAR DISC DISORDER WITH MYELOPATHY: ICD-10-CM

## 2022-03-20 DIAGNOSIS — M54.42 CHRONIC BILATERAL LOW BACK PAIN WITH LEFT-SIDED SCIATICA: Primary | ICD-10-CM

## 2022-03-20 DIAGNOSIS — I70.212 ATHEROSCLEROSIS OF NATIVE ARTERIES OF EXTREMITIES WITH INTERMITTENT CLAUDICATION, LEFT LEG: ICD-10-CM

## 2022-03-20 DIAGNOSIS — M79.652 PAIN OF LEFT THIGH: Primary | ICD-10-CM

## 2022-03-20 DIAGNOSIS — G89.29 CHRONIC BILATERAL LOW BACK PAIN WITH LEFT-SIDED SCIATICA: Primary | ICD-10-CM

## 2022-03-21 ENCOUNTER — TELEPHONE (OUTPATIENT)
Dept: FAMILY MEDICINE CLINIC | Facility: CLINIC | Age: 72
End: 2022-03-21

## 2022-03-21 NOTE — TELEPHONE ENCOUNTER
Caller: BHAVNA JARAMILLO    Relationship to patient: Emergency Contact    Best call back number: [phone]    Patient is needing: PATIENT'S WIFE CALLED AND SAID HE WOULD LIKE TO GET THE MRI SENT TO [facility] TODAY OR [facility], WHICHEVER CAN TAKE HIM SOONEST    SHE SAID [facility] WAS ALREADY FILLING UP FOR THE WEEK WHEN SHE CALLED EARLY    SHE SAID HE IS IN A LOT OF PAIN AND WANTING TO GET IN AS SOON AS POSSIBLE     SHE IS ALSO WANTING TO MAKE SURE HE IS REFERRED TO THE [facility] PHYSICAL THERAPY DEPARTMENT          Carmelita at [place] 1.0 in 6/2017

## 2022-03-21 NOTE — TELEPHONE ENCOUNTER
Caller: Lane Aldana    Relationship: Self    Best call back number:[phone]     What is the best time to reach you: ANYTIME    Who are you requesting to speak with (clinical staff, provider,  specific   staff member): CLINICAL    Do you know the name of the person who called: LANE    What was the call regarding:LANE WOULD LIKE TO KNOW IF DR. BURRELL REQUESTED THE MRI, PHYSICAL THERAPY AND CIRCULATION TEST, HE AND DR. BURRELL DISCUSSED 3/20/2022     Do you require a callback: YES

## 2022-03-22 ENCOUNTER — HOSPITAL ENCOUNTER (OUTPATIENT)
Dept: MRI IMAGING | Facility: HOSPITAL | Age: 72
Discharge: HOME | End: 2022-03-22
Admitting: FAMILY MEDICINE

## 2022-03-22 ENCOUNTER — TELEPHONE (OUTPATIENT)
Dept: FAMILY MEDICINE CLINIC | Facility: CLINIC | Age: 72
End: 2022-03-22

## 2022-03-22 DIAGNOSIS — M51.06 LUMBAR DISC DISORDER WITH MYELOPATHY: ICD-10-CM

## 2022-03-22 DIAGNOSIS — G89.29 CHRONIC BILATERAL LOW BACK PAIN WITH LEFT-SIDED SCIATICA: ICD-10-CM

## 2022-03-22 DIAGNOSIS — M54.42 CHRONIC BILATERAL LOW BACK PAIN WITH LEFT-SIDED SCIATICA: ICD-10-CM

## 2022-03-22 PROCEDURE — 72148 MRI LUMBAR SPINE W/O DYE: CPT

## 2022-03-22 NOTE — TELEPHONE ENCOUNTER
Called and left message for patient stating that the order that Dr. Fonseca had put in is MRI Lumbar Spine without contrast. And I also let patient that Dr. Fonseca was off today.

## 2022-03-22 NOTE — PROGRESS NOTES
Hazel tell Miguelito that I do not think he has a surgical target that the neurosurgeons or back surgeons would want to go after.  It looks like he is a good candidate for pain management to put an epidural blocks and may be facet blocks.  He be a good candidate to continue aggressive physical therapy.  If he is not already set up with these modalities let me know and we will order them but I think we already have him seeing these people

## 2022-03-22 NOTE — TELEPHONE ENCOUNTER
Caller: Luis Enrique Aldana    Relationship to patient: Self    Best call back number: [phone]    Patient is needing: PATIENT CALLED AND SAID THAT HE RECEIVED A NOTIFICATION THAT HE HAS AN MRI WITHOUT CONTRAST SCHEDULED WITHIN AN HOUR AT [facility]    HE WAS THINKING DR. BURRELL WANTED HIM TO HAVE AN MRI WITH CONTRAST    WARM TRANSFERRED TO OFFICE TO SPEAK TO LIZ

## 2022-03-22 NOTE — TELEPHONE ENCOUNTER
[facility] has been trying to reach patient to get patient scheduled for the MRI.  I will send a message to patient through MY Chart letting patient know this.

## 2022-03-23 ENCOUNTER — HOSPITAL ENCOUNTER (OUTPATIENT)
Dept: CARDIOLOGY | Facility: HOSPITAL | Age: 72
Discharge: HOME | End: 2022-03-23
Admitting: FAMILY MEDICINE

## 2022-03-23 DIAGNOSIS — I70.212 ATHEROSCLEROSIS OF NATIVE ARTERIES OF EXTREMITIES WITH INTERMITTENT CLAUDICATION, LEFT LEG: ICD-10-CM

## 2022-03-23 DIAGNOSIS — M79.652 PAIN OF LEFT THIGH: ICD-10-CM

## 2022-03-23 LAB
BH CV LOWER ARTERIAL LEFT ABI RATIO: 1.14
BH CV LOWER ARTERIAL LEFT DORSALIS PEDIS SYS MAX: 204
BH CV LOWER ARTERIAL LEFT GREAT TOE SYS MAX: 147
BH CV LOWER ARTERIAL LEFT POST TIBIAL SYS MAX: 211
BH CV LOWER ARTERIAL LEFT TBI RATIO: 0.79
BH CV LOWER ARTERIAL RIGHT ABI RATIO: 1.25
BH CV LOWER ARTERIAL RIGHT DORSALIS PEDIS SYS MAX: 232
BH CV LOWER ARTERIAL RIGHT GREAT TOE SYS MAX: 166
BH CV LOWER ARTERIAL RIGHT POST TIBIAL SYS MAX: 207
BH CV LOWER ARTERIAL RIGHT TBI RATIO: 0.9
MAXIMAL PREDICTED HEART RATE: 150 BPM
STRESS TARGET HR: 128 BPM
UPPER ARTERIAL LEFT ARM BRACHIAL SYS MAX: 185
UPPER ARTERIAL RIGHT ARM BRACHIAL SYS MAX: 184

## 2022-03-23 PROCEDURE — 93922 UPR/L XTREMITY ART 2 LEVELS: CPT

## 2022-03-29 ENCOUNTER — TELEPHONE (OUTPATIENT)
Dept: FAMILY MEDICINE CLINIC | Facility: CLINIC | Age: 72
End: 2022-03-29

## 2022-03-29 DIAGNOSIS — S39.012A STRAIN OF LUMBAR PARASPINOUS MUSCLE, INITIAL ENCOUNTER: ICD-10-CM

## 2022-03-29 DIAGNOSIS — M54.32 LEFT SIDED SCIATICA: ICD-10-CM

## 2022-03-29 RX ORDER — TIZANIDINE 4 MG/1
4 TABLET ORAL EVERY 8 HOURS PRN
Qty: 30 TABLET | Refills: 0 | Status: SHIPPED | OUTPATIENT
Start: 2022-03-29 | End: 2022-04-28

## 2022-03-29 NOTE — TELEPHONE ENCOUNTER
Incoming Refill Request      Medication requested (name and dose):     tiZANidine (Zanaflex) 4 MG tablet  4 mg, Every 8 Hours PRN         Pharmacy where request should be sent: [id number] [address] AT [facility] OF [place] [address] & [facility] LINE  - [phone]  - [phone]   [phone]    Additional details provided by patient: PATIENT HAS THREE PILLS LEFT    Best call back number: [phone]    Does the patient have less than a 3 day supply:  [x] Yes  [] No    Ludmila Padgett Rep  03/29/22, 11:09 EDT

## 2022-04-05 ENCOUNTER — TREATMENT (OUTPATIENT)
Dept: PHYSICAL THERAPY | Facility: CLINIC | Age: 72
End: 2022-04-05

## 2022-04-05 DIAGNOSIS — M79.652 LEFT THIGH PAIN: Primary | ICD-10-CM

## 2022-04-05 PROCEDURE — 97110 THERAPEUTIC EXERCISES: CPT | Performed by: PHYSICAL THERAPIST

## 2022-04-05 PROCEDURE — 97161 PT EVAL LOW COMPLEX 20 MIN: CPT | Performed by: PHYSICAL THERAPIST

## 2022-04-05 NOTE — PROGRESS NOTES
Physical Therapy Initial Evaluation and Plan of Care    Patient: Luis Enrique Aldana   : 1950  Diagnosis/ICD-10 Code:  Left thigh pain [M79.652]  Referring practitioner: Agustin Fonseca MD  Date of Initial Visit: 2022  Today's Date: 2022  Patient seen for 1 sessions         Visit Diagnoses:    ICD-10-CM ICD-9-CM   1. Left thigh pain  M79.652 729.5       Subjective Questionnaire: Oswestry: 32% disability      Subjective Evaluation    History of Present Illness  Mechanism of injury: Medical History and surgery reviewed in Epic.  Cataract, HTN, colon polyps  Has been having Left back hip pain for about a year. MD showed him some stretches that helped(Piriformis). 5 weeks ago  Did some carpentry work, bending and reaching up over the course of 3 days. Probably did too much. The stretches are no longer working. The leg pain is now mainly in his Left thigh and it is causing left knee pain at times from walking awkwardly.   Having trouble sleeping: best on R side with pillow between the legs. Usually alternates but now can not sleep on Left side. Has to get up after about 2 hours and sit in recliner to relieve the pressure. Can go back to sleep after using heat on left thigh.  Originally pain started in the L buttock first day, initially could stretch it out. Next 2 days it spread down the L  hip into front thigh.  MD gave him a muscle relaxer and told to takeTtylenol.  A week later MD gave him a Steroid dose pack. Ordered MRI (3 mild bulging disc)and circulation test for left leg(normal).      Subjective comment: 70 y/o w/m c/o L thigh and calf pain for the last 5 weeks.  Patient Occupation: retired, enjoys carpentry work and yard work Pain  Current pain ratin (sitting at rest: left buttock 1/10, sitting is most comfortable. )  At best pain ratin (Best in sittin/10 left buttock pain. )  At worst pain ratin (standing , walking aggravates hip and left thigh., after walking more than 100 yards,  has to stand stop before he can continue on. )  Location: Left buttock dull ache turns into burning, thigh pain when standing, walking is like a cramp. Sometimes shin pain  Relieving factors: heat, medications and change in position (Occasional Tylenol and muscle relaxer., rest in sitting or recliner is best. )  Exacerbated by: standing, walking more than 100 yards,, bending makes thigh pain worse, no worse in back.   Progression: no change (Left hip pain not as bad, now mainly Left thigh pain. )    Social Support  Lives in: multiple-level home (sometimes he has to alternate steps when in too much pain. )  Lives with: spouse    Hand dominance: right    Diagnostic Tests  MRI studies: abnormal (see Epic 3 mild broad based disc bulges lumbar)    Patient Goals  Patient goals for therapy: decreased pain, improved balance, increased motion, increased strength, independence with ADLs/IADLs and return to sport/leisure activities  Patient goal: Pain free motion, get back to [employer] and work out with weights, be able to go on vacation to [place] in September.           Objective          Postural Observations  Seated posture: fair  Standing posture: fair  Correction of posture: makes symptoms better (Sitting with lumbar roll made left 1/10 buttock pain go away. )        Active Range of Motion     Lumbar   Flexion: 72 (L groin pain during) degrees   Extension: 18 (L groin/thigh stretch during) degrees   Left lateral flexion: 22 (L groin pain during, no worse after.) degrees   Right lateral flexion: 25 (No efeect(NE) on pain) degrees     Additional Active Range of Motion Details  Sensation to light touch: normal both legs except left lateral shin decreased.  MMT: both legs 5/5 except left foot DF 4/5  SLR: (-)  BREN: (-) both hip flexors tight, L>R.   Increased left groin pain during sit to stand: 3 to 4/10 during, in standing L groin and thigh pain back down to 2/10 stops above the knee.  When walking 4/10 anterior left  thigh pain walking about 100 ft.  Repeat EIS: partial ROM, producing gentle stretch l groin and thigh durin/10 no worse after.  Trial sitting extension: less left groin pain during sit to stand after.  Prone: L thigh pain back down to 2/10  Prone on elbows: left groin and thigh pain back up to 4 to 5/10.   Prone pelvis over pillow calmed Left groin pain back down to 2/10.   Advised patient to start on below ex.   Before sleep can try prone: pelvis over pillow to stretch hip out but DO NOT sleep that way.  Discussed seeing patient again tomorrow and Friday due to PT on Vacation next week.  Wanting to make sure on direction for HEP.       Access Code: [email or web address]  URL: [email or web address]  Date: 2022  Prepared by: Kellen Blackwell    Exercises  Seated Correct Posture - 3 x daily - 7 x weekly - 1 reps  Seated Lumbar Extension - 3 x daily - 7 x weekly - 1 sets - 10 reps  Standing Lumbar Extension - 5 x daily - 7 x weekly - 10 reps - 1 sets    Patient Education  Office Posture  Lifting Techniques  Household Activities          Assessment & Plan     Assessment  Impairments: abnormal gait, abnormal or restricted ROM, activity intolerance, impaired balance, impaired physical strength, lacks appropriate home exercise program, pain with function and safety issue  Functional Limitations: lifting, sleeping, walking, pulling, pushing, uncomfortable because of pain, moving in bed, standing and stooping  Assessment details: 70 y/o w/m c/o left buttock left anterior thigh pain occasional left shin pain, Left foot DF weakness. More pain standing and walking, less in sitting. Better after sitting with lumbar roll and sitting extension before sit to stand. Patient will benefit from skilled PT.L buttock thigh pain    Barriers to therapy: HTN, Hx left LBP for the last years, cataract eye sx.   Prognosis: good    Goals  Plan Goals: Pt presents to PT with symptoms consistent with L buttock thigh pain, ref. From back ?. Pt  would benefit from skilled PT intervention to address the deficits noted.      SHORT TERM GOALS: Time for goal achievement:  3 weeks  1. Pt will be able to demonstrate initial HEP.  2. Pt reports L Buttock/thigh pain at least 50% better.  3. Pt can demo safe body mechanics.  4. Pt can tolerate core strength ex.    LONG TERM GOALS: Time for goal achievement: 12 visits  1. Pt to score < 20 on Modified Oswestry score.  2. Pt reports he is ready for DC to [facility] for self management of his condition.   3. Pt has full pain free AROM lumbar in standing.   4. Patient no longer having Left groin pain during sit to stand.     Plan  Therapy options: will be seen for skilled therapy services  Planned modality interventions: cryotherapy, electrical stimulation/Russian stimulation, TENS, thermotherapy (hydrocollator packs), traction and ultrasound  Other planned modality interventions: AQUATIC PT  Planned therapy interventions: abdominal trunk stabilization, body mechanics training, gait training, home exercise program, manual therapy, neuromuscular re-education, soft tissue mobilization, spinal/joint mobilization, strengthening, stretching and therapeutic activities  Frequency: 2x week (first week 3 x week then 1 to 2 weeks as needed.)  Duration in visits: 12  Duration in weeks: 10  Treatment plan discussed with: patient        History # of Personal Factors and/or Comorbidities: MODERATE (1-2)  Examination of Body System(s): # of elements: LOW (1-2)  Clinical Presentation: STABLE   Clinical Decision Making: LOW     Timed:         Manual Therapy:         mins  66380;     Therapeutic Exercise:    15     mins  90426;     Neuromuscular Mandeep:        mins  24271;    Therapeutic Activity:          mins  01070;     Gait Training:           mins  16923;     Ultrasound:          mins  45644;    Ionto                                   mins   36804  Self Care                            mins   48179  Aquatic                                mins   12437        Un-Timed:  Electrical Stimulation:         mins  82470 ( );  Dry Needling          mins self-pay  Traction          mins 48582  Low Eval     30     Mins  91179  Mod Eval          Mins  65421  High Eval                            Mins  85236  Re-Eval                               mins  11665        Timed Treatment:   15   mins   Total Treatment:     45   mins    PT SIGNATURE: Kellen Blackwell PT   [place] License #: [id number]      Medicare Initial Certification  Certification Period: 7/3/2022  I certify that the therapy services are furnished while this patient is under my care.  The services outlined above are required by this patient, and will be reviewed every 90 days.    Physician Signature: ___________________________________________________________    PHYSICIAN: Agustin Fonseca MD      DATE:     Please sign and return via fax to [phone].. Thank you, [facility].

## 2022-04-06 ENCOUNTER — TREATMENT (OUTPATIENT)
Dept: PHYSICAL THERAPY | Facility: CLINIC | Age: 72
End: 2022-04-06

## 2022-04-06 DIAGNOSIS — M79.652 LEFT THIGH PAIN: Primary | ICD-10-CM

## 2022-04-06 PROCEDURE — G0283 ELEC STIM OTHER THAN WOUND: HCPCS | Performed by: PHYSICAL THERAPIST

## 2022-04-06 PROCEDURE — 97110 THERAPEUTIC EXERCISES: CPT | Performed by: PHYSICAL THERAPIST

## 2022-04-06 NOTE — PROGRESS NOTES
Physical Therapy Daily Progress Note  VISIT#: 2 POC 12      Patient: Luis Enrique Aldana  : 1950  Referring practitioner: Agustin Fonseca MD  Date of Initial Visit: Type: THERAPY  Noted: 2022  Today's Date: 2022  Patient seen for 2 sessions      Visit Diagnosis:    ICD-10-CM ICD-9-CM   1. Left thigh pain  M79.652 729.5       Subjective   Came in still 2/10 Left LBP/buttock pain, feels L groin a little more stretched out: 3 to 410 after EIS.   This am was able to be prone without pillow. Still trouble walking.  Has a Left lateral Quad Muscle ridge from a previous contusion 1.5 years ago.     Objective   See Exercise, Manual, and Modality Logs for complete treatment.     Patient Education: review/up grade HEP.  Not able to tolerate BRYAN. Had to go back to prone over pillow to let it calm down.  Did tolerate 3 x 10 Knee flexion in prone with some worse Left thigh pain during but recovering after.    Assessment/Plan  Not able to progress to BRYAN yet.  Finished with IF/ES to low back with MHP.   Advised to use muscle cream on left thigh for cramping/muscle bump.   Left feeling better after RX and ES    Progress per Plan of Care and Progress strengthening /stabilization /functional activity            Timed:         Manual Therapy:         mins  86876;     Therapeutic Exercise:    30     mins  04259;     Neuromuscular Mandeep:        mins  88345;    Therapeutic Activity:          mins  18799;     Gait Training:           mins  09445;     Ultrasound:          mins  35140;    Ionto                                   mins   87109  Self Care                            mins   97987  Canalith Repos                  mins  4209  Aquatic                               mins 98833    Un-Timed:  Electrical Stimulation:    15     mins  45635 ( );  Dry Needling          mins self-pay  Traction          mins 46049    Timed Treatment: 30     mins   Total Treatment:     45   mins    Kellen Blackwell PT  [place] License #  [id number]  Physical Therapist

## 2022-04-08 ENCOUNTER — TREATMENT (OUTPATIENT)
Dept: PHYSICAL THERAPY | Facility: CLINIC | Age: 72
End: 2022-04-08

## 2022-04-08 DIAGNOSIS — M79.652 LEFT THIGH PAIN: Primary | ICD-10-CM

## 2022-04-08 PROCEDURE — 97035 APP MDLTY 1+ULTRASOUND EA 15: CPT | Performed by: PHYSICAL THERAPIST

## 2022-04-08 PROCEDURE — 97110 THERAPEUTIC EXERCISES: CPT | Performed by: PHYSICAL THERAPIST

## 2022-04-08 NOTE — PROGRESS NOTES
Physical Therapy Daily Progress Note  VISIT#: 3 POC 12      Patient: Luis Enrique Aldana  : 1950  Referring practitioner: Agustin Fonseca MD  Date of Initial Visit: Type: THERAPY  Noted: 2022  Today's Date: 2022  Patient seen for 3 sessions      Visit Diagnosis:    ICD-10-CM ICD-9-CM   1. Left thigh pain  M79.652 729.5       Subjective     Came in still 2/10 Left buttock Thigh pain, feels L groin a little more stretched out.  Has been working on it. Feels like he can walk a little better. Still gets some quivering in his anterior thigh during prone knee bends.     Objective     See Exercise, Manual, and Modality Logs for complete treatment.     Patient Education: review/up grade HEP.  Started with warm up on TM, was able to 10 min with only mild increase left lateral hip pain 2 to 3/10 but also 1to 2/10 left shin burning. Shin pain subsided once stopped walking on TM.   Followed by HS and Runner stretches on step. Did not change left hip pain.   Did US to Left anterior/lateral/thigh. Had spasms in Left thigh during after 2 minutes in supine with leg over roll. Was able to continue with leg in Hook lying.  After was able to tolerate prone and Prone knee flexion x 10 each leg but after BRYAN. Had to get up and sit because of Left thigh pain going back up to 7/10.  Did 10 PPU after. He seemed to tolerate those better than BRYAN for 30 sec.  Left with Left thigh/hip pain NO worse.  Assessment/Plan    Advised to walk 10 to 15 min 3 x day. Continue prone and BYRAN when able to tolerate. Try PPU 10 partial ROM again, since he seemed to tolerate them better than BRYAN.   Use home tens on back or leg if it helps.  Will try US again next time, if it produces spasms again may have patient see Dr. Shea for scan of Left thigh.  Progress per Plan of Care and Progress strengthening /stabilization /functional activity            Timed:         Manual Therapy:         mins  53194;     Therapeutic Exercise:    35     mins   75948;     Neuromuscular Mandeep:        mins  21730;    Therapeutic Activity:          mins  11811;     Gait Training:           mins  35688;     Ultrasound:    10      mins  65541;    Ionto                                   mins   14634  Self Care                            mins   84825  Canalith Repos                  mins  4209  Aquatic                               mins 46227    Un-Timed:  Electrical Stimulation:         mins  63674 ( );  Dry Needling          mins self-pay  Traction          mins 59917    Timed Treatment:45    mins   Total Treatment:     45   mins    Kellen Blackwell PT  [place] License # [id number]  Physical Therapist

## 2022-04-20 ENCOUNTER — TREATMENT (OUTPATIENT)
Dept: PHYSICAL THERAPY | Facility: CLINIC | Age: 72
End: 2022-04-20

## 2022-04-20 DIAGNOSIS — M79.652 LEFT THIGH PAIN: Primary | ICD-10-CM

## 2022-04-20 PROCEDURE — 97140 MANUAL THERAPY 1/> REGIONS: CPT | Performed by: PHYSICAL THERAPIST

## 2022-04-20 PROCEDURE — 97110 THERAPEUTIC EXERCISES: CPT | Performed by: PHYSICAL THERAPIST

## 2022-04-20 PROCEDURE — 97530 THERAPEUTIC ACTIVITIES: CPT | Performed by: PHYSICAL THERAPIST

## 2022-04-20 NOTE — PROGRESS NOTES
Physical Therapy Daily Progress Note  VISIT#: 4 POC 12      Patient: Luis Enrique Aldana  : 1950  Referring practitioner: Agustin Fonseca MD  Date of Initial Visit: Type: THERAPY  Noted: 2022  Today's Date: 2022  Patient seen for 4 sessions      Visit Diagnosis:    ICD-10-CM ICD-9-CM   1. Left thigh pain  M79.652 729.5       Subjective     Came in with 1/10 Left buttock/ Thigh pain, L leg still stiffens up when walking longer. Has been working on it. Feels like he can walk a little better.     Objective     See Exercise, Manual, and Modality Logs for complete treatment.     Patient Education: review/up grade HEP.  Started with warm up on TM, was able to 10 min with mild increase left anterior thigh pain 3/10 but also 1to 2/10 left shin burning and trouble picking left foot up after 5 min. Shin pain subsided once stopped walking on TM.   Followed by HS and Runner stretches on step. Did not change left hip pain.   Added fig 4 ER hip stretch: no effect on Left thigh pain.  Repeat EIS 3 x 10 reps thigh pain down to 1 or 2/10.  Able to do knee flexion with less hip flex.   2 x 10 PPU witout aggravating Left thigh pain after, only during.   Still only able to do 30 sec BRYAN before Left thigh getting spasm. Advised to try BRYAN for only 20 sec to see if he can do more than one a day.  HOLD US to thigh due to adverse effect last time and also big dark skin spot on thigh, wanting to have MD rule out skin cancer.  Left thigh felt better after STM/deep tissue massage L anterior thigh and lateral thigh. Lateral Thigh and Tensor Fascia estee very tight.      Assessment/Plan    Advised to do interval walking 10 min 3 x day as able to stop if when tight gets more sore or gets drop foot. Continue prone and BRYAN & PPU 10 & EIS and new hip stretches.       Progress per Plan of Care and Progress strengthening /stabilization /functional activity            Timed:         Manual Therapy:     15    mins  36344;     Therapeutic  Exercise:    30     mins  91141;     Neuromuscular Mandeep:        mins  27865;    Therapeutic Activity:     15     mins  58820;     Gait Training:           mins  05543;     Ultrasound:          mins  25279;    Ionto                                   mins   08456  Self Care                            mins   04747  Canalith Repos                  mins  4209  Aquatic                               mins 98307    Un-Timed:  Electrical Stimulation:         mins  13544 ( );  Dry Needling          mins self-pay  Traction          mins 27642    Timed Treatment: 60    mins   Total Treatment:     60  mins    Kellen Blackwell PT  [place] License # [id number]  Physical Therapist

## 2022-04-22 ENCOUNTER — TREATMENT (OUTPATIENT)
Dept: PHYSICAL THERAPY | Facility: CLINIC | Age: 72
End: 2022-04-22

## 2022-04-22 DIAGNOSIS — M79.652 LEFT THIGH PAIN: Primary | ICD-10-CM

## 2022-04-22 PROCEDURE — 97110 THERAPEUTIC EXERCISES: CPT | Performed by: PHYSICAL THERAPIST

## 2022-04-22 PROCEDURE — 97530 THERAPEUTIC ACTIVITIES: CPT | Performed by: PHYSICAL THERAPIST

## 2022-04-22 PROCEDURE — 97140 MANUAL THERAPY 1/> REGIONS: CPT | Performed by: PHYSICAL THERAPIST

## 2022-04-22 NOTE — PROGRESS NOTES
Physical Therapy Daily Progress Note  VISIT#: 5 POC 12      Patient: Luis Enrique Aldana  : 1950  Referring practitioner: Agustin Fonseca MD  Date of Initial Visit: Type: THERAPY  Noted: 2022  Today's Date: 2022  Patient seen for 5 sessions      Visit Diagnosis:    ICD-10-CM ICD-9-CM   1. Left thigh pain  M79.652 729.5       Subjective     Came in with 0/10 Left buttock/ Thigh pain, just stiff in LB from standing too long working on a project yesterday.  Objective     See Exercise, Manual, and Modality Logs for complete treatment.     Patient Education: review/up grade HEP.  Started with warm up on TM, was able to 10 min without production of pain.  Still having left foot flap after 5 min worse, some burning in left shin.   Followed by STM to Left anterior/lat thigh: felt good during.  Followed by Prone/BRYAN alternating with Prone knee flexion 3 x 10. 3 rd rep BRYAN 20 sec.  Produced Left LBP/buttock, gone in prone after.   Followed by Thoracic PPU x 10, Lumbar PPU x 10: LBP during no worse after.   EIS x 10 after: better, less pain during and more flexibility.  Practiced Jeremiah stretch; still L groin pain when raising left hip. Also Left Hip pain with transition from prone to sitting.  Still tight L>R HS and hip flexor.             Assessment/Plan     Continue prone and BRYAN & PPU 10 & EIS and hip stretches.   Try increasing walking duration /interval walking for now, try 3x 3 day 20 sec BRYAN for Left longer duration hip stretch.      Progress per Plan of Care and Progress strengthening /stabilization /functional activity            Timed:         Manual Therapy:     15    mins  21478;     Therapeutic Exercise:    15     mins  21562;     Neuromuscular Mandeep:        mins  22387;    Therapeutic Activity:     15     mins  35814;     Gait Training:           mins  95477;     Ultrasound:          mins  36972;    Ionto                                   mins   23931  Self Care                            mins    66600  [facility] Repos                  mins  4209  Aquatic                               mins 54833    Un-Timed:  Electrical Stimulation:         mins  04164 ( );  Dry Needling          mins self-pay  Traction          mins 68312    Timed Treatment: 45    mins   Total Treatment:     45  mins    Kellen Blackwell PT  [place] License # [id number]  Physical Therapist

## 2022-04-25 ENCOUNTER — TREATMENT (OUTPATIENT)
Dept: PHYSICAL THERAPY | Facility: CLINIC | Age: 72
End: 2022-04-25

## 2022-04-25 DIAGNOSIS — M79.652 LEFT THIGH PAIN: Primary | ICD-10-CM

## 2022-04-25 PROCEDURE — 97140 MANUAL THERAPY 1/> REGIONS: CPT | Performed by: PHYSICAL THERAPIST

## 2022-04-25 PROCEDURE — 97530 THERAPEUTIC ACTIVITIES: CPT | Performed by: PHYSICAL THERAPIST

## 2022-04-25 PROCEDURE — 97110 THERAPEUTIC EXERCISES: CPT | Performed by: PHYSICAL THERAPIST

## 2022-04-25 NOTE — PROGRESS NOTES
"Physical Therapy Daily Progress Note  VISIT#: 6 POC 12      Patient: Luis Enrique Aldana  : 1950  Referring practitioner: Agustin Fonseca MD  Date of Initial Visit: Type: THERAPY  Noted: 2022  Today's Date: 2022  Patient seen for 6 sessions      Visit Diagnosis:    ICD-10-CM ICD-9-CM   1. Left thigh pain  M79.652 729.5       Subjective     Came in with 2 to 3/10 Left buttock/ Thigh sore and stiff from ride over here. Was feeling better this am after his stretches but it stiffens up after sitting.   He walked 20 min at his daughter's house over the weekend.  He has been able to sleep better.     Objective     See Exercise, Manual, and Modality Logs for complete treatment.     Patient Education: review/up grade HEP.  Started with warm up on TM, was able to 10 min without production of pain.  Still having to focus hard to prevent left foot  From flapping after 5 min. Better control. Still some burning in left shin coming and going but he feels like it is improving.  Followed by STM + IASTYm to Left anterior/lat thigh: Felt less tight during.  Followed by Prone/BRYAN alternating with Prone knee flexion 3 x 10. 3 rd rep BRYAN 20 sec.    Followed by Thoracic PPU x 10, Lumbar PPU x 10: LBP during no worse after.   EIS x 10 after: better, less pain during and more flexibility.  Practiced Jeremiah stretch; still L groin pain when raising left hip. Also Left Hip pain with transition from prone to sitting.  Still tight L>R HS and hip flexor.             Assessment/Plan     Continue prone and BRYAN & PPU 10 & EIS and hip stretches.   Try increasing walking duration /interval walking for now, try 3x 3 day \"30  Then 40 sec up to 1 min\" BRYAN for Left longer duration hip stretch.      Progress per Plan of Care and Progress strengthening /stabilization /functional activity            Timed:         Manual Therapy:     15    mins  08954;     Therapeutic Exercise:    15     mins  67872;     Neuromuscular Mandeep:        mins  41090;  "   Therapeutic Activity:     15     mins  54222;     Gait Training:           mins  01745;     Ultrasound:          mins  55627;    Ionto                                   mins   02270  Self Care                            mins   30748  Canalith Repos                  mins  4209  Aquatic                               mins 35070    Un-Timed:  Electrical Stimulation:         mins  78914 ( );  Dry Needling          mins self-pay  Traction          mins 15771    Timed Treatment: 45    mins   Total Treatment:     45  mins    Kellen Blackwell PT  [place] License # [id number]  Physical Therapist

## 2022-04-27 ENCOUNTER — TREATMENT (OUTPATIENT)
Dept: PHYSICAL THERAPY | Facility: CLINIC | Age: 72
End: 2022-04-27

## 2022-04-27 DIAGNOSIS — M79.652 LEFT THIGH PAIN: Primary | ICD-10-CM

## 2022-04-27 PROCEDURE — 97110 THERAPEUTIC EXERCISES: CPT | Performed by: PHYSICAL THERAPIST

## 2022-04-27 PROCEDURE — 97140 MANUAL THERAPY 1/> REGIONS: CPT | Performed by: PHYSICAL THERAPIST

## 2022-04-27 PROCEDURE — 97530 THERAPEUTIC ACTIVITIES: CPT | Performed by: PHYSICAL THERAPIST

## 2022-04-27 NOTE — PROGRESS NOTES
Physical Therapy Daily Progress Note  VISIT#: 7 POC 12      Patient: Luis Enrique Aldana  : 1950  Referring practitioner: Agustin Fonseca MD  Date of Initial Visit: Type: THERAPY  Noted: 2022  Today's Date: 2022  Patient seen for 7 sessions      Visit Diagnosis:    ICD-10-CM ICD-9-CM   1. Left thigh pain  M79.652 729.5       Subjective     Came in with 1 to 2/10 Left buttock/ Thigh sore and stiff.   He did a of of walking in the yard with a back pack yesterday, up to 30 min.  Feels he is doing overall at least 70% better since start of PT.      Objective     See Exercise, Manual, and Modality Logs for complete treatment.     Patient Education: review/up grade HEP.  Started with warm up on TM, was able to 10 min without worse pain. Better control of foot when slowing down a little.    Access Code: [email or web address]  URL: [email or web address]  Date: 2022  Prepared by: Kellen Blackwell    Exercises  Standing Hip Flexion with Anchored Resistance and Chair Support - 1 x daily - 7 x weekly - 10 reps - 2 sets  Standing Hip Extension with Anchored Resistance - 1 x daily - 7 x weekly - 10 reps - 2 sets  Standing Hip Adduction with Anchored Resistance - 1 x daily - 7 x weekly - 10 reps - 2 sets  Standing Hip Abduction with Anchored Resistance - 1 x daily - 7 x weekly - 10 reps - 2 sets           Assessment/Plan     Continue prone and BRYAN & PPU 10 & EIS and hip stretches.   Try increasing walking duration /interval walking.   BRYAN still producing Left thigh pain during but able to progress to 30 sec x 3  Added Thigh strength to HEP with standing 4 way hip with red TB started with 10 reps. Patient encouraged to add 2 more every day if not producing worse Left leg pain.     Progress per Plan of Care and Progress strengthening /stabilization /functional activity            Timed:         Manual Therapy:     15    mins  49144;     Therapeutic Exercise:    15     mins  90986;     Neuromuscular Mandeep:        mins   82696;    Therapeutic Activity:     15     mins  34581;     Gait Training:           mins  13205;     Ultrasound:          mins  72391;    Ionto                                   mins   75009  Self Care                            mins   56860  Canalith Repos                  mins  4209  Aquatic                               mins 53022    Un-Timed:  Electrical Stimulation:         mins  96932 ( );  Dry Needling          mins self-pay  Traction          mins 47771    Timed Treatment: 45    mins   Total Treatment:     45  mins    Kellen Blackwell PT  [place] License # [id number]  Physical Therapist

## 2022-04-28 DIAGNOSIS — M54.32 LEFT SIDED SCIATICA: ICD-10-CM

## 2022-04-28 DIAGNOSIS — S39.012A STRAIN OF LUMBAR PARASPINOUS MUSCLE, INITIAL ENCOUNTER: ICD-10-CM

## 2022-04-28 RX ORDER — TIZANIDINE 4 MG/1
TABLET ORAL
Qty: 30 TABLET | Refills: 0 | Status: SHIPPED | OUTPATIENT
Start: 2022-04-28

## 2022-05-02 ENCOUNTER — TREATMENT (OUTPATIENT)
Dept: PHYSICAL THERAPY | Facility: CLINIC | Age: 72
End: 2022-05-02

## 2022-05-02 DIAGNOSIS — M79.652 LEFT THIGH PAIN: Primary | ICD-10-CM

## 2022-05-02 PROCEDURE — 97530 THERAPEUTIC ACTIVITIES: CPT | Performed by: PHYSICAL THERAPIST

## 2022-05-02 PROCEDURE — 97110 THERAPEUTIC EXERCISES: CPT | Performed by: PHYSICAL THERAPIST

## 2022-05-02 PROCEDURE — 97035 APP MDLTY 1+ULTRASOUND EA 15: CPT | Performed by: PHYSICAL THERAPIST

## 2022-05-02 NOTE — PROGRESS NOTES
Physical Therapy Daily Progress Note  VISIT#: 8 POC 12      Patient: Luis Enrique Aldana  : 1950  Referring practitioner: Agustin Fonseca MD  Date of Initial Visit: Type: THERAPY  Noted: 2022  Today's Date: 2022  Patient seen for 8 sessions      Visit Diagnosis:    ICD-10-CM ICD-9-CM   1. Left thigh pain  M79.652 729.5       Subjective     Came in with 0/10 Left buttock/ LThigh still stiff, still cramps up at times.   He did a lot of steps/stairs this weekend and did better than he anticipated.  Wants to try US to L thigh again.  Gave patient Oswestry to fill out for Re-assess next PT session due to interruption in PT after next session.     Objective     See Exercise, Manual, and Modality Logs for complete treatment.     Patient Education: review/up grade HEP.  Started with warm up on TM, was able to do 8 min 2.5 mph with Left thigh pain up to 6/10 at the end. Stopped hurting when he stopped walking.   Progressed HS/Runner stretches to 3 rd step L leg still more tight.   Progressed to 20 reps TB 4 way hip in standing.    Access Code: [email or web address]  URL: [email or web address]  Date: 2022  Prepared by: Kellen Blackwell    Exercises  Sit to Stand Without Arm Support - 1 x daily - 7 x weekly - 1 sets - 10 reps  Supine Double Knee to Chest - 2 x daily - 7 x weekly - 1 sets - 10 reps           Assessment/Plan  still most L Quad pain during prone to sit.     Continue prone and BRYAN & PPU 10 & EIS and hip stretches.   Resumed lumbar flexion in supine, no adverse effect from flexion Or Us today. Did 5 min at 1 and 5 min at 3 HZ.  Patient educated to always do a set of EIS or PPU before and after lumbar flexion for now.  SHORT TERM GOALS: Time for goal achievement:  3 weeks  1. Pt will be able to demonstrate initial HEP- met  2. Pt reports L Buttock/thigh pain at least 50% better - met  3. Pt can demo safe body mechanics - demo/discussed  4. Pt can tolerate core strength ex. - making progress    LONG TERM  GOALS: Time for goal achievement: 12 visits  1. Pt to score < 20 on Modified Oswestry score.  2. Pt reports he is ready for DC to [facility] for self management of his condition.   3. Pt has full pain free AROM lumbar in standing.   4. Patient no longer having Left groin pain during sit to stand.     Progress per Plan of Care and Progress strengthening /stabilization /functional activity            Timed:         Manual Therapy:         mins  44997;     Therapeutic Exercise:    30     mins  58289;     Neuromuscular Mandeep:        mins  08015;    Therapeutic Activity:     15     mins  84848;     Gait Training:           mins  56768;     Ultrasound:     10     mins  41501;    Ionto                                   mins   57223  Self Care                            mins   99921  Canalith Repos                  mins  4209  Aquatic                               mins 42300    Un-Timed:  Electrical Stimulation:         mins  64703 ( );  Dry Needling          mins self-pay  Traction          mins 71768    Timed Treatment:  55   mins   Total Treatment:     55  mins    Kellen Blackwell, PT  [place] License # [id number]  Physical Therapist

## 2022-05-04 ENCOUNTER — TREATMENT (OUTPATIENT)
Dept: PHYSICAL THERAPY | Facility: CLINIC | Age: 72
End: 2022-05-04

## 2022-05-04 DIAGNOSIS — M79.652 LEFT THIGH PAIN: Primary | ICD-10-CM

## 2022-05-04 PROCEDURE — 97035 APP MDLTY 1+ULTRASOUND EA 15: CPT | Performed by: PHYSICAL THERAPIST

## 2022-05-04 PROCEDURE — 97110 THERAPEUTIC EXERCISES: CPT | Performed by: PHYSICAL THERAPIST

## 2022-05-04 NOTE — PROGRESS NOTES
Physical Therapy Re-Certification Of Plan of  Care        Patient: Luis Enrique Aldana   : 1950  Diagnosis/ICD-10 Code:  Left thigh pain [M79.652]  Referring practitioner: Agustin Fonseca MD  Date of Initial Visit: Type: THERAPY  Noted: 2022  Today's Date: 2022  Patient seen for 9 sessions      Subjective:   Visit Diagnosis:    ICD-10-CM ICD-9-CM   1. Left thigh pain  M79.652 729.5       Luis Enrique Aldana reports: came in with NO Back or leg pain. During TM warm up, No pain and no trouble with left foot slapping due to wearing better shoes for walking. Reports % better, Left thigh pain 80% better.Subjective Questionnaire: Oswestry: 0%  Clinical Progress: improved  Home Program Compliance: Yes  Treatment has included: therapeutic exercise, manual therapy, therapeutic activity, gait training and ultrasound    Subjective     Objective          Postural Observations  Seated posture: fair  Standing posture: fair        Active Range of Motion     Lumbar   Flexion: 70 (NE) degrees   Extension: 18 (L groin/thigh stretch during) degrees   Left lateral flexion: 25 (L groin pain during, no worse after.) degrees   Right lateral flexion: 25 (No efeect(NE) on pain) degrees     Additional Active Range of Motion Details  Sensation to light touch: normal both legs except left lateral shin decreased but improving.  MMT: both legs 5/5 except left foot DF now up to 4+/5  SLR: (-)< L Hamstring more tight.  BREN: (-) both hip flexors tight, L>R.   Much less and less often left groin pain during sit to stand and roll from supine to sit. NO pain when using log roll.  He is now able to walk 10 to 12 minutes without producing LBP or left leg/thigh pain.  Repeat EIS:  producing gentle stretch in L groin and thigh during but no worse after.  And retesting left side glide no longer produce left groin/hip pain during.  Patient has been able to progress to SKTC and lumbar flexion in supine.  Did trial sitting and standing flexion   No worse pain after.  Advised patient to continue with lumbar flexion in supine always followed by EIS or PPU for the rest of this week. Next Monday try to progress to sitting Flexion.  Patient will be on vacation for the next 20 days.  Patient to call in when he has questions.        Assessment/Plan    Progress toward previous goals: Partially Met  SHORT TERM GOALS: Time for goal achievement:  3 weeks  1. Pt will be able to demonstrate initial HEP- met  2. Pt reports L Buttock/thigh pain at least 50% better - met  3. Pt can demo safe body mechanics - demo/discussed including taking breaks when traveling, do EIS before handling luggage.  4. Pt can tolerate core strength ex. - met/4 way hip TB    LONG TERM GOALS: Time for goal achievement: 12 visits  1. Pt to score < 20 on Modified Oswestry score - met  2. Pt reports he is ready for [facility] to [facility] for [facility] management of his condition - not met  3. Pt has full pain free AROM lumbar in standing - making progress  4. Patient no longer having Left groin pain during sit to stand - 80% better.    Goals  Short-term goals (STG): 4/4 met  Long-term goals (LTG): 1/4 met      Recommendations: Continue with recommendations 10 more visits after today to work on recovery of function and remodel Left thigh mucles.  Timeframe: 3 months  Prognosis to achieve goals: good    Timed:         Manual Therapy:         mins  20729;     Therapeutic Exercise:    35     mins  17392;     Neuromuscular Mandeep:        mins  26911;    Therapeutic Activity:          mins  86687;     Gait Training:           mins  77303;     Ultrasound:     10     mins  37387;    Ionto                                   mins   46129  Self Care                            mins   41548  Aquatic                               mins 59798      Un-Timed:  Electrical Stimulation:         mins  96222 ( );  Dry Needling          mins self-pay  Traction          mins 77840  Low Eval          Mins  52302  Mod Eval           Mins  16596  High Eval                            Mins  11996  Re-Eval                               mins  14203      Timed Treatment:   45   mins   Total Treatment:     45   mins      PT Signature: Kellen Blackwell PT  [place] License #: [id number]    Certification Period: @ TD @ thru 8/1/2022  I certify that the therapy services are furnished while this patient is under my care. The services   outlined above are required by this patient, and will be reviewed every 90 days.    Physician Signature: __________________________________________________________    PHYSICIAN:  Agustin Fonseca MD  DATE:      Please sign and return via fax to [phone].  Thank you, [facility] Physical Therapy.

## 2022-05-11 RX ORDER — LISINOPRIL 20 MG/1
20 TABLET ORAL DAILY
Qty: 90 TABLET | Refills: 3 | Status: SHIPPED | OUTPATIENT
Start: 2022-05-11 | End: 2022-11-11

## 2022-05-20 ENCOUNTER — TREATMENT (OUTPATIENT)
Dept: PHYSICAL THERAPY | Facility: CLINIC | Age: 72
End: 2022-05-20

## 2022-05-20 DIAGNOSIS — M79.652 LEFT THIGH PAIN: Primary | ICD-10-CM

## 2022-05-20 PROCEDURE — 97530 THERAPEUTIC ACTIVITIES: CPT | Performed by: PHYSICAL THERAPIST

## 2022-05-20 PROCEDURE — 97035 APP MDLTY 1+ULTRASOUND EA 15: CPT | Performed by: PHYSICAL THERAPIST

## 2022-05-20 PROCEDURE — 97110 THERAPEUTIC EXERCISES: CPT | Performed by: PHYSICAL THERAPIST

## 2022-05-20 NOTE — PROGRESS NOTES
Physical Therapy Daily Progress Note  VISIT#: 10 POC 12      Patient: Luis Enrique Aldana  : 1950  Referring practitioner: Agustin Fonseca MD  Date of Initial Visit: Type: THERAPY  Noted: 2022  Today's Date: 2022  Patient seen for 10 sessions      Visit Diagnosis:    ICD-10-CM ICD-9-CM   1. Left thigh pain  M79.652 729.5       Subjective     Came in with 0/10 Left buttock/ LThigh  Pain. Did his ex on vacation and did well. Still L LBP when walking longer but can stretch it out.    Objective     See Exercise, Manual, and Modality Logs for complete treatment.     Patient Education: review/up grade HEP.  Started with warm up on TM, was able to do 10 min 3 mph  No back or leg pain produced. Practiced HS/Runner stretches on 3 rd step L leg still more tight.    20 reps TB 4 way hip in standing.    Access Code: [email or web address]  URL: [email or web address]  Date: 2022  Prepared by: Kellen Blackwell    Exercises  Sidelying ITB Stretch off Table - 1 x daily - 7 x weekly - 1 sets - 3 reps - 20sec hold           Assessment/Plan     Resumed lumbar flexion in sitting, no adverse effect from flexion Or Us today. Did 5 min at 1 and 5 min at 3 HZ. Added IT band stretch to HEP. Still pain in Left hip in L side lying on it. No pain during stretch.  Patient educated to always do a set of EIS or PPU before and after lumbar flexion for now.      Progress per Plan of Care and Progress strengthening /stabilization /functional activity            Timed:         Manual Therapy:         mins  01855;     Therapeutic Exercise:    25     mins  71118;     Neuromuscular Mandeep:        mins  72254;    Therapeutic Activity:     10     mins  74371;     Gait Training:           mins  05952;     Ultrasound:     10     mins  10064;    Ionto                                   mins   18591  Self Care                            mins   51093  [facility]                  mins  4209  Aquatic                               mins  55102    Un-Timed:  Electrical Stimulation:         mins  53828 ( );  Dry Needling          mins self-pay  Traction          mins 05217    Timed Treatment:  45   mins   Total Treatment:     45  mins    Kellen Blackwell PT  [place] License # [id number]  Physical Therapist

## 2022-05-24 ENCOUNTER — TREATMENT (OUTPATIENT)
Dept: PHYSICAL THERAPY | Facility: CLINIC | Age: 72
End: 2022-05-24

## 2022-05-24 DIAGNOSIS — M79.652 LEFT THIGH PAIN: Primary | ICD-10-CM

## 2022-05-24 PROCEDURE — 97110 THERAPEUTIC EXERCISES: CPT | Performed by: PHYSICAL THERAPIST

## 2022-05-24 PROCEDURE — 97530 THERAPEUTIC ACTIVITIES: CPT | Performed by: PHYSICAL THERAPIST

## 2022-05-24 NOTE — PROGRESS NOTES
Physical Therapy Daily Progress Note  VISIT#: 11 POC 12      Patient: Luis Enrique Aldana  : 1950  Referring practitioner: Agustin Fonseca MD  Date of Initial Visit: Type: THERAPY  Noted: 2022  Today's Date: 2022  Patient seen for 11 sessions      Visit Diagnosis:    ICD-10-CM ICD-9-CM   1. Left thigh pain  M79.652 729.5       Subjective     Came in with 0/10 Left buttock/ LThigh Pain. 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  Objective     See Exercise, Manual, and Modality Logs for complete treatment.     Patient Education: review/up grade HEP.  Started with warm up on TM, was able to do 8 min 3 mph  No back or leg pain produced. Practiced HS/Runner stretches on 3 rd step L leg still more tight.    30 reps TB Blue: 4 way foot DF; Red TB for 4 way hip in standing.     Access Code: [email or web address]  URL: [email or web address]  Date: 2022  Prepared by: Kellen Blackwell    Exercises  Supine Lower Trunk Rotation - 1 x daily - 7 x weekly - 1 sets - 10 reps        Assessment/Plan  Added lumbar trunk rotation supine to HEP; 10 th felt better than first. Legs to  R still causing pinch in Left groin during. Continued with US to Left thigh and groin but did in side lying to get to L greater Trochanter also due to still report of pain when lying on it to do R IT band stretch.          Progress per Plan of Care and Progress strengthening /stabilization /functional activity            Timed:         Manual Therapy:         mins  61350;     Therapeutic Exercise:    27     mins  36259;     Neuromuscular Mandeep:        mins  58415;    Therapeutic Activity:     10     mins  60569;     Gait Training:           mins  48371;     Ultrasound:     10     mins  85949;  Dropped US charge due to medicare rule  Ionto                                   mins   19539  Self Care                            mins   59880  Canalith Repos                  mins   4209  Aquatic                               mins 40500    Un-Timed:  Electrical Stimulation:         mins  35266 ( );  Dry Needling          mins self-pay  Traction          mins 60575    Timed Treatment:  47   mins   Total Treatment:     47  mins    Kellen Blackwell PT  [place] License # [id number]  Physical Therapist

## 2022-06-02 ENCOUNTER — TREATMENT (OUTPATIENT)
Dept: PHYSICAL THERAPY | Facility: CLINIC | Age: 72
End: 2022-06-02

## 2022-06-02 DIAGNOSIS — M79.652 LEFT THIGH PAIN: Primary | ICD-10-CM

## 2022-06-02 PROCEDURE — 97035 APP MDLTY 1+ULTRASOUND EA 15: CPT | Performed by: PHYSICAL THERAPIST

## 2022-06-02 PROCEDURE — 97530 THERAPEUTIC ACTIVITIES: CPT | Performed by: PHYSICAL THERAPIST

## 2022-06-02 PROCEDURE — 97110 THERAPEUTIC EXERCISES: CPT | Performed by: PHYSICAL THERAPIST

## 2022-06-02 NOTE — PROGRESS NOTES
Physical Therapy Daily Progress Note  VISIT#: 12 POC 12      Patient: Luis Enrique Aldana  : 1950  Referring practitioner: Agustin Fonseca MD  Date of Initial Visit: Type: THERAPY  Noted: 2022  Today's Date: 2022  Patient seen for 12 sessions      Visit Diagnosis:    ICD-10-CM ICD-9-CM   1. Left thigh pain  M79.652 729.5       Subjective     Came in with some tightness in Left buttock/ L Thigh. Woke up this am at 6 am with spasm in Left thigh and it caused left LB to cramp up. L thigh pain is now more localized to upper 1/3 groin and lateral hip when acting up, no longer down to knee.   He already did 10 min on his row machine this am.       Objective     See Exercise, Manual, and Modality Logs for complete treatment.     Patient Education: review/up grade HEP.  Started with warm up on TM, did 6 min 3 mph and had to stop to tie shoe laces.   Back and L thigh less stiff after.  Practiced HS/Runner stretches on 3 rd step L leg still more tight but improving. Needed help with positioning for second IT side stretch.    30 reps TB Blue: 4 way foot DF; Red TB for 4 way hip in standing.  Progressed to standing flexion: 5 sitting, 5 standing flexion lumbar followed by 10 EIS.  Finished with US to Left upper thigh in side lying.  Still ridge in lower thigh but no more pain at ridge site.           Assessment/Plan  Added lumbar  Flexion in standing. Educated patient: always EIS or PPU after flexion, rotation and lifting.  SHORT TERM GOALS: Time for goal achievement:  3 weeks  1. Pt will be able to demonstrate initial HEP- met  2. Pt reports L Buttock/thigh pain at least 50% better - met  3. Pt can demo safe body mechanics - met  4. Pt can tolerate core strength ex. - met/4 way hip TB    LONG TERM GOALS: Time for goal achievement: 12 visits  1. Pt to score < 20 on Modified Oswestry score - met  2. Pt reports he is ready for DC to Independent [facility] for self management of his condition - not met  3. Pt has full pain  free AROM lumbar in standing - making progress: started lumbar flexion in standing today  4. Patient no longer having Left groin pain during sit to stand - 80% better.    Progress per Plan of Care and Progress strengthening /stabilization /functional activity            Timed:         Manual Therapy:         mins  79401;     Therapeutic Exercise:    30     mins  10684;     Neuromuscular Mandeep:        mins  71289;    Therapeutic Activity:     15     mins  41382;     Gait Training:           mins  52152;     Ultrasound:     10     mins  92352;   Ionto                                   mins   81793  Self Care                            mins   49837  Canalith Repos                  mins  4209  Aquatic                               mins 23325    Un-Timed:  Electrical Stimulation:         mins  96397 ( );  Dry Needling          mins self-pay  Traction          mins 66839    Timed Treatment:  55   mins   Total Treatment:     55  mins    Kellen Blackwell PT  [place] License # [id number]  Physical Therapist

## 2022-06-17 ENCOUNTER — TREATMENT (OUTPATIENT)
Dept: PHYSICAL THERAPY | Facility: CLINIC | Age: 72
End: 2022-06-17

## 2022-06-17 DIAGNOSIS — M79.652 LEFT THIGH PAIN: Primary | ICD-10-CM

## 2022-06-17 PROCEDURE — 97110 THERAPEUTIC EXERCISES: CPT | Performed by: PHYSICAL THERAPIST

## 2022-06-17 PROCEDURE — 97530 THERAPEUTIC ACTIVITIES: CPT | Performed by: PHYSICAL THERAPIST

## 2022-06-17 NOTE — PROGRESS NOTES
Physical Therapy Daily Progress Note  VISIT#: 13  New POC 19      Patient: Luis Enrique Aldana  : 1950  Referring practitioner: Agustin Fonseca MD  Date of Initial Visit: Type: THERAPY  Noted: 2022  Today's Date: 2022  Patient seen for 13 sessions      Visit Diagnosis:    ICD-10-CM ICD-9-CM   1. Left thigh pain  M79.652 729.5       Subjective     Coming in after brake from PT with NO LBP, No leg pain.   Did have one episode of Left hip pain when rolling over in bed after a 1/2 walk. Did do his leg stretches after.  Declining US today.      Objective     See Exercise, Manual, and Modality Logs for complete treatment.     Patient Education: review/up grade HEP.  Started with warm up on TM, did 10 min 3 mph NO more Back or L thigh less stiff after.  Practiced HS/Runner stretches on 3 rd step L leg no longer as tight.  Still needed help with positioning for second IT side stretch.   Red TB for 4 way hip in standing x 20 reps  Practiced:  standing flexion: 5 sitting, 5 standing flexion lumbar followed by 10 EIS.           Assessment/Plan  Patient pleased with his progress so far.  Making progress to the rest of his goals.    SHORT TERM GOALS: Time for goal achievement:  3 weeks  1. Pt will be able to demonstrate initial HEP- met  2. Pt reports L Buttock/thigh pain at least 50% better - met  3. Pt can demo safe body mechanics - met  4. Pt can tolerate core strength ex. - met/4 way hip TB    LONG TERM GOALS: Time for goal achievement: 12 visits  1. Pt to score < 20 on Modified Oswestry score - met  2. Pt reports he is ready for DC to Independent HEP for self management of his condition - not met  3. Pt has full pain free AROM lumbar in standing - making progress: started lumbar flexion in standing today  4. Patient no longer having Left groin pain during sit to stand - 80% better.    Progress per Plan of Care and Progress strengthening /stabilization /functional activity            Timed:         Manual  Therapy:         mins  32760;     Therapeutic Exercise:    35     mins  51208;     Neuromuscular Mandeep:        mins  29619;    Therapeutic Activity:     10     mins  29022;     Gait Training:           mins  39167;     Ultrasound:          mins  53711;   Ionto                                   mins   19366  Self Care                            mins   07321  Canalith Repos                  mins  4209  Aquatic                               mins 82158    Un-Timed:  Electrical Stimulation:         mins  74193 ( );  Dry Needling          mins self-pay  Traction          mins 76076    Timed Treatment:  45   mins   Total Treatment:     45  mins    Kellen Blackwell PT  [place] License # [id number]  Physical Therapist

## 2022-06-20 ENCOUNTER — TREATMENT (OUTPATIENT)
Dept: PHYSICAL THERAPY | Facility: CLINIC | Age: 72
End: 2022-06-20

## 2022-06-20 DIAGNOSIS — M79.652 LEFT THIGH PAIN: Primary | ICD-10-CM

## 2022-06-20 PROCEDURE — 97530 THERAPEUTIC ACTIVITIES: CPT | Performed by: PHYSICAL THERAPIST

## 2022-06-20 PROCEDURE — 97110 THERAPEUTIC EXERCISES: CPT | Performed by: PHYSICAL THERAPIST

## 2022-06-20 NOTE — PROGRESS NOTES
Physical Therapy Daily Progress Note  VISIT#: 14  New POC 19      Patient: Luis Enrique Aldana  : 1950  Referring practitioner: Agustin Fonseca MD  Date of Initial Visit: Type: THERAPY  Noted: 2022  Today's Date: 2022  Patient seen for 14 sessions      Visit Diagnosis:    ICD-10-CM ICD-9-CM   1. Left thigh pain  M79.652 729.5       Subjective     Coming in with NO LBP, No leg pain.        Objective     See Exercise, Manual, and Modality Logs for complete treatment.     Patient Education: review/up grade HEP.  Started with warm up on TM, did 10 min 3 mph NO LBP or leg pain during or after.   Practiced HS/Runner stretches on 3 rd step L leg no longer as tight.  Did  Better with positioning for second IT side stretch.   Advanced to BRYAN Knee flexion.  Red TB for 4 way hip in standing x 20 reps  Practiced:  standing flexion: 5 sitting, 5 standing flexion lumbar followed by 10 EIS.           Assessment/Plan  Patient pleased with his progress so far. He was able to do what he needed to do this weekend.  LTR Left side still discomfort Left side first rep better after stretches.   Making progress to the rest of his goals.    SHORT TERM GOALS: Time for goal achievement:  3 weeks  1. Pt will be able to demonstrate initial HEP- met  2. Pt reports L Buttock/thigh pain at least 50% better - met  3. Pt can demo safe body mechanics - met  4. Pt can tolerate core strength ex. - met/4 way hip TB    LONG TERM GOALS: Time for goal achievement: 12 visits  1. Pt to score < 20 on Modified Oswestry score - met  2. Pt reports he is ready for DC to Independent HEP for self management of his condition - not met  3. Pt has full pain free AROM lumbar in standing - making progress: started lumbar flexion in standing today  4. Patient no longer having Left groin pain during sit to stand - 80% better.    Progress per Plan of Care and Progress strengthening /stabilization /functional activity            Timed:         Manual Therapy:          mins  23797;     Therapeutic Exercise:    35     mins  40167;     Neuromuscular Mandeep:        mins  26110;    Therapeutic Activity:     10     mins  13239;     Gait Training:           mins  66948;     Ultrasound:          mins  73468;   Ionto                                   mins   48703  Self Care                            mins   65134  Canalith Repos                  mins  4209  Aquatic                               mins 81977    Un-Timed:  Electrical Stimulation:         mins  87673 ( );  Dry Needling          mins self-pay  Traction          mins 20194    Timed Treatment:  45   mins   Total Treatment:     45  mins    Kellen Blackwell PT  [place] License # [id number]  Physical Therapist

## 2022-06-22 ENCOUNTER — TREATMENT (OUTPATIENT)
Dept: PHYSICAL THERAPY | Facility: CLINIC | Age: 72
End: 2022-06-22

## 2022-06-22 DIAGNOSIS — M79.652 LEFT THIGH PAIN: Primary | ICD-10-CM

## 2022-06-22 PROCEDURE — 97110 THERAPEUTIC EXERCISES: CPT | Performed by: PHYSICAL THERAPIST

## 2022-06-22 PROCEDURE — 97530 THERAPEUTIC ACTIVITIES: CPT | Performed by: PHYSICAL THERAPIST

## 2022-06-22 NOTE — PROGRESS NOTES
"Physical Therapy Daily Progress Note  VISIT#: 15  New POC 19      Patient: Luis Enrique Aldana  : 1950  Referring practitioner: Agustin Fonseca MD  Date of Initial Visit: Type: THERAPY  Noted: 2022  Today's Date: 2022  Patient seen for 15 sessions      Visit Diagnosis:    ICD-10-CM ICD-9-CM   1. Left thigh pain  M79.652 729.5       Subjective     Coming in with NO LBP, No leg pain.   Still reports a hitch in his left LB occasionally with certain movements. Short twinge. Has been doing a lot of yard work the last few days.       Objective     See Exercise, Manual, and Modality Logs for complete treatment.     Patient Education: review/up grade HEP.  Started with warm up on TM, did 10 min 3 mph NO LBP or leg pain during or after.   Practiced HS/Runner stretches on 3 rd step L leg still grabs a little doing his HS stretches.  Advanced to NK table flexion/ext with 5#; feels like he could handle more resist.      Assessment/Plan  Making progress to the rest of his goals.    SHORT TERM GOALS: Time for goal achievement:  3 weeks  1. Pt will be able to demonstrate initial HEP- met  2. Pt reports L Buttock/thigh pain at least 50% better - met  3. Pt can demo safe body mechanics - met  4. Pt can tolerate core strength ex. - met/4 way hip TB     LONG TERM GOALS: Time for goal achievement: 12 visits  1. Pt to score < 20 on Modified Oswestry score - met  2. Pt reports he is ready for DC to Independent [facility]P for self management of his condition - not met  3. Pt has full pain free AROM lumbar in standing - met  4. Patient no longer having Left groin pain during sit to stand - met/\"goes away with stretches\"    Progress per Plan of Care and Progress strengthening /stabilization /functional activity            Timed:         Manual Therapy:         mins  94777;     Therapeutic Exercise:    35     mins  40174;     Neuromuscular Mandeep:        mins  73901;    Therapeutic Activity:     10     mins  89146;     Gait Training:    "        mins  09466;     Ultrasound:          mins  48765;   Ionto                                   mins   36599  Self Care                            mins   26133  Canalith Repos                  mins  4209  Aquatic                               mins 13066    Un-Timed:  Electrical Stimulation:         mins  72169 ( );  Dry Needling          mins self-pay  Traction          mins 30282    Timed Treatment:  45   mins   Total Treatment:     45  mins    Kellen Blackwell PT  [place] License # [id number]  Physical Therapist

## 2022-06-27 ENCOUNTER — TREATMENT (OUTPATIENT)
Dept: PHYSICAL THERAPY | Facility: CLINIC | Age: 72
End: 2022-06-27

## 2022-06-27 DIAGNOSIS — M79.652 LEFT THIGH PAIN: Primary | ICD-10-CM

## 2022-06-27 PROCEDURE — 97530 THERAPEUTIC ACTIVITIES: CPT | Performed by: PHYSICAL THERAPIST

## 2022-06-27 PROCEDURE — 97110 THERAPEUTIC EXERCISES: CPT | Performed by: PHYSICAL THERAPIST

## 2022-06-27 NOTE — PROGRESS NOTES
"Physical Therapy Daily Progress Note  VISIT#: 16  New POC 19      Patient: Luis Enrique Aldana  : 1950  Referring practitioner: Agustin Fonseca MD  Date of Initial Visit: Type: THERAPY  Noted: 2022  Today's Date: 2022  Patient seen for 16 sessions      Visit Diagnosis:    ICD-10-CM ICD-9-CM   1. Left thigh pain  M79.652 729.5       Subjective     Coming in with NO LBP, No leg pain.   Went to an event this weekend where he was standing on concrete for 4 hours. His back did get stiff after. He did do some of his ex after. He feels maybe he should have gone for a walk after instead.   He may wish to make his next PT session his last, therefore gave him Oswestry to fill out for next session.    Objective     See Exercise, Manual, and Modality Logs for complete treatment.     Patient Education: reviewed/up graded NK table resist.  Started with warm up on TM, did 8 min 3 mph NO LBP or leg pain during or after.   NK table 10# Left thigh still feels a little weaker.  Practiced HS/Runner stretches in supine today.   IT band stretch still sometimes L feels different from R.    Assessment/Plan  Possible DC to HEP next PT session.    SHORT TERM GOALS: Time for goal achievement:  3 weeks  1. Pt will be able to demonstrate initial HEP- met  2. Pt reports L Buttock/thigh pain at least 50% better - met  3. Pt can demo safe body mechanics - met  4. Pt can tolerate core strength ex. - met/4 way hip TB     LONG TERM GOALS: Time for goal achievement: 12 visits  1. Pt to score < 20 on Modified Oswestry score - met  2. Pt reports he is ready for DC to Independent HEP for self management of his condition - not met  3. Pt has full pain free AROM lumbar in standing - met  4. Patient no longer having Left groin pain during sit to stand - met/\"goes away with stretches\"    Progress per Plan of Care and Progress strengthening /stabilization /functional activity            Timed:         Manual Therapy:         mins  49464;   "   Therapeutic Exercise:    35     mins  10781;     Neuromuscular Mandeep:        mins  52950;    Therapeutic Activity:     10     mins  74275;     Gait Training:           mins  64800;     Ultrasound:          mins  35571;   Ionto                                   mins   44164  Self Care                            mins   52323  Canalith Repos                  mins  4209  Aquatic                               mins 68280    Un-Timed:  Electrical Stimulation:         mins  66915 ( );  Dry Needling          mins self-pay  Traction          mins 53645    Timed Treatment:  45   mins   Total Treatment:     45  mins    Kellen Blackwell PT  [place] License # [id number]  Physical Therapist

## 2022-06-29 ENCOUNTER — TREATMENT (OUTPATIENT)
Dept: PHYSICAL THERAPY | Facility: CLINIC | Age: 72
End: 2022-06-29

## 2022-06-29 DIAGNOSIS — M79.652 LEFT THIGH PAIN: Primary | ICD-10-CM

## 2022-06-29 PROCEDURE — 97530 THERAPEUTIC ACTIVITIES: CPT | Performed by: PHYSICAL THERAPIST

## 2022-06-29 PROCEDURE — 97110 THERAPEUTIC EXERCISES: CPT | Performed by: PHYSICAL THERAPIST

## 2022-06-29 NOTE — PROGRESS NOTES
"Physical Therapy Daily Progress Note  VISIT#: 17  New POC 19      Patient: Luis Enrique Aldana  : 1950  Referring practitioner: Agustin Fonseca MD  Date of Initial Visit: Type: THERAPY  Noted: 2022  Today's Date: 2022  Patient seen for 17 sessions      Visit Diagnosis:    ICD-10-CM ICD-9-CM   1. Left thigh pain  M79.652 729.5       Subjective     Coming in with NO LBP, No leg pain, but he did wake up x 3 last night with twinges in his Left groin/thigh. It went away by itself. He feels it is due to not walking yesterday. He did other chores in his garage. He decided to keep his next 2 PT appointments to keep himself accountable and keep doing his ex while he is out of town.   Oswestry score today: 0% disability.     Objective     See Exercise, Manual, and Modality Logs for complete treatment.     Patient Education: reviewed/up graded NK table resist.  Started with warm up on TM, did 8 min 3 mph NO LBP or leg pain during or after.   NK table 12.5# Left thigh still feels a little weaker.  Practiced HS/Runner stretches in supine today.   LTR first few legs to R still twinge in Left groin thigh, better after 10 reps.     Assessment/Plan  Patient to continue with HEP during vacation.     SHORT TERM GOALS: Time for goal achievement:  3 weeks  1. Pt will be able to demonstrate initial HEP- met  2. Pt reports L Buttock/thigh pain at least 50% better - met  3. Pt can demo safe body mechanics - met  4. Pt can tolerate core strength ex. - met/4 way hip TB     LONG TERM GOALS: Time for goal achievement: 12 visits  1. Pt to score < 20 on Modified Oswestry score - met O% today  2. Pt reports he is ready for DC to Independent HEP for self management of his condition - not met  3. Pt has full pain free AROM lumbar in standing - met  4. Patient no longer having Left groin pain during sit to stand - met/\"goes away with stretches\"    Progress per Plan of Care and Progress strengthening /stabilization /functional " activity            Timed:         Manual Therapy:         mins  72607;     Therapeutic Exercise:    35     mins  25677;     Neuromuscular Mandeep:        mins  31014;    Therapeutic Activity:     10     mins  83398;     Gait Training:           mins  83375;     Ultrasound:          mins  82509;   Ionto                                   mins   69643  Self Care                            mins   18233  Canalith Repos                  mins  4209  Aquatic                               mins 67052    Un-Timed:  Electrical Stimulation:         mins  03888 ( );  Dry Needling          mins self-pay  Traction          mins 86772    Timed Treatment:  45   mins   Total Treatment:     45  mins    Kellen Blackwell PT  [place] License # [id number]  Physical Therapist

## 2022-07-15 ENCOUNTER — TREATMENT (OUTPATIENT)
Dept: PHYSICAL THERAPY | Facility: CLINIC | Age: 72
End: 2022-07-15

## 2022-07-15 ENCOUNTER — PATIENT MESSAGE (OUTPATIENT)
Dept: FAMILY MEDICINE CLINIC | Facility: CLINIC | Age: 72
End: 2022-07-15

## 2022-07-15 DIAGNOSIS — M79.652 LEFT THIGH PAIN: Primary | ICD-10-CM

## 2022-07-15 PROCEDURE — 97110 THERAPEUTIC EXERCISES: CPT | Performed by: PHYSICAL THERAPIST

## 2022-07-15 PROCEDURE — 97530 THERAPEUTIC ACTIVITIES: CPT | Performed by: PHYSICAL THERAPIST

## 2022-07-15 NOTE — PROGRESS NOTES
Physical Therapy Discharge Summary          Patient: Luis Enrique Aldana  : 1950  Diagnosis/ICD-10 Code: Left thigh pain [M79.652]  Referring practitioner: Agustin Fonseca MD  Date of Initial Visit: Type: THERAPY  Noted: 2022  Today's Date: 7/15/2022  Patient seen for 18 sessions      Visit Diagnosis:    ICD-10-CM ICD-9-CM   1. Left thigh pain  M79.652 729.5       Discharge Status of Patient: See MD Note dated: 7/15/22    Goals: All Met    Discharge Plan: Continue with current home exercise program as instructed    Comments:  Patient pleased with his progress, ready to be DC to HEP.    Discharge date: 7/15/22        Kellen Blackwell PT  [place] License # [id number]  Physical Therapist

## 2022-07-15 NOTE — PROGRESS NOTES
Physical Therapy  Progress Note/Reasessment      Patient: Luis Enrique Aldana   : 1950  Diagnosis/ICD-10 Code:  Left thigh pain [M79.652]  Referring practitioner: Agustin Fonseca MD  Date of Initial Visit: Type: THERAPY  Noted: 2022  Today's Date: 7/15/2022  Patient seen for 18 sessions      Subjective:   Visit Diagnosis:    ICD-10-CM ICD-9-CM   1. Left thigh pain  M79.652 729.5       Luis Enrique Aldana reports: he is doing much better overall and wishes to be DC from PT to HEP after today visit. Coming in with no LBP or hip pain just a little sore from walking yesterday.   Subjective Questionnaire: Oswestry: 0%  Clinical Progress: improved  Home Program Compliance: Yes  Treatment has included: therapeutic exercise, neuromuscular re-education, manual therapy, therapeutic activity, gait training and ultrasound    Subjective     Objective          Postural Observations  Seated posture: fair (making more effort)  Standing posture: fair (still mild increased thoracic kyphosis)        Active Range of Motion     Lumbar   Flexion: 70 (NE) degrees   Extension: 20 (L groin/thigh stretch during) degrees   Left lateral flexion: 25 (NE) degrees   Right lateral flexion: 25 (NE) degrees     Additional Active Range of Motion Details  Sensation to light touch: normal both   MMT: both legs 5/5 including left foot DF   SLR: (-) L Hamstring/Quad still a little more tight.  BREN: (-) both hip flexors less tight, L>R.   No longer c/o left groin pain during sit to stand and roll from supine to sit.   He is now able to walk longer without  LBP or L thigh pain.        Assessment/Plan    Progress toward previous goals: All Met      SHORT TERM GOALS: Time for goal achievement:  3 weeks  1. Pt will be able to demonstrate initial HEP- met  2. Pt reports L Buttock/thigh pain at least 50% better - met  3. Pt can demo safe body mechanics - met  4. Pt can tolerate core strength ex. - met    LONG TERM GOALS: Time for goal achievement: 12  visits  1. Pt to score < 20 on Modified Oswestry score - met O% today  2. Pt reports he is ready for DC to [facility] for self management of his condition - met  3. Pt has full pain free AROM lumbar in standing - met  4. Patient no longer having Left groin pain during sit to stand - met      Recommendations: Discharge to [facility]    Timed:         Manual Therapy:         mins  66141;     Therapeutic Exercise:    35     mins  24648;     Neuromuscular Mandeep:        mins  42401;    Therapeutic Activity:     10     mins  06800;     Gait Training:           mins  46034;     Ultrasound:          mins  93181;    Ionto                                   mins   21594  Self Care                            mins   20106  Aquatic                               mins 92648      Un-Timed:  Electrical Stimulation:         mins  43319 ( );  Dry Needling          mins self-pay  Traction          mins 06297  Low Eval          Mins  64693  Mod Eval          Mins  65187  High Eval                            Mins  79389  Re-Eval                               mins  81049      Timed Treatment:   45   mins   Total Treatment:     45   mins      PT Signature: Kellen Blackwell PT  [place] License #: [id number]

## 2022-07-17 NOTE — TELEPHONE ENCOUNTER
From: Luis Enrique Aldana  To: Agustin Fonseca MD  Sent: 7/15/2022 12:46 PM EDT  Subject: Vaccination required for travel to [facility] and I will be visiting [place] in October. Would you please ask your staff to review our immunization records to ensure we are current for all routine vaccinations? Specific vaccines we are concerned about are found on this cdc website:  www.CDC.gov/vaccines/schedules/hcp/imz/adult.html    I believe we are current with most routine immunizations, but am uncertain about Hepatitis and Meningitis as well as pneumonia and shingles.     Thank you   Luis Enrique Aldana (1950)  Aixa Aldana (2/8/1948)

## 2022-07-18 ENCOUNTER — TELEPHONE (OUTPATIENT)
Dept: FAMILY MEDICINE CLINIC | Facility: CLINIC | Age: 72
End: 2022-07-18

## 2022-07-18 NOTE — TELEPHONE ENCOUNTER
Caller: TO JARAMILLO    Relationship: SELF    Best call back number: [phone]    What form or medical record are you requesting: VACCINATION RECORDS     Who is requesting this form or medical record from you:     How would you like to receive the form or medical records (pick-up, mail, fax): MAIL FORM TO ADDRESS ON FILE   If fax, what is the fax number:   If mail, what is the address:  If pick-up, provide patient with address and location details    Timeframe paperwork needed: AS SOON AS POSSIBLE    Additional notes:

## 2022-08-01 ENCOUNTER — TELEPHONE (OUTPATIENT)
Dept: FAMILY MEDICINE CLINIC | Facility: CLINIC | Age: 72
End: 2022-08-01

## 2022-08-01 RX ORDER — ATENOLOL 50 MG/1
TABLET ORAL
Qty: 180 TABLET | Refills: 0 | Status: SHIPPED | OUTPATIENT
Start: 2022-08-01 | End: 2022-11-11 | Stop reason: SDUPTHER

## 2022-08-01 RX ORDER — ATENOLOL 50 MG/1
50 TABLET ORAL 2 TIMES DAILY
Qty: 180 TABLET | Refills: 1 | Status: SHIPPED | OUTPATIENT
Start: 2022-08-01 | End: 2022-11-11

## 2022-08-01 NOTE — TELEPHONE ENCOUNTER
Spoke with patient and [facility] at 9:20am.

## 2022-08-01 NOTE — TELEPHONE ENCOUNTER
Caller: Luis Enrique Aldana    Relationship to patient: Self    Best call back number: [phone]    Chief complaint: LABS     Type of visit: LAB    Requested date: WEEK BEFORE APPOINTMENT IN November      If rescheduling, when is the original appointment: 09/01     Additional notes:REQUESTED CALLBACK TO RESCHEDULE, HUB ATTEMPTED TO WARM TRANSFER AND WAS UNABLE TO DO SO

## 2022-08-01 NOTE — TELEPHONE ENCOUNTER
Incoming Refill Request      Medication requested (name and dose):   atenolol (Tenormin) 50 MG tablet ()  50 mg, 2 times daily         Pharmacy where request should be sent:  [id number] [place], [place] - [facility] SEC OF [facility] [address] [facility] [address]  - [phone]  - [phone]   [phone]    Additional details provided by patient: PATIENT RESCHEDULED HIS 6 MONTH FOLLOW UP VISIT FROM September TO November DUE TO HIM TRAVELING     HE SAID HE IS OUT OF REFILLS ON THIS AND WILL NEED A THREE MONTH SUPPLY BEFORE HE LEAVES ON THE TRIP    Best call back number: [phone]    Does the patient have less than a 3 day supply:  [x] Yes  [] No    Ludmila Padgett Rep  22, 10:19 EDT

## 2022-08-05 ENCOUNTER — CLINICAL SUPPORT (OUTPATIENT)
Dept: FAMILY MEDICINE CLINIC | Facility: CLINIC | Age: 72
End: 2022-08-05

## 2022-08-05 DIAGNOSIS — Z23 NEED FOR HEPATITIS VACCINATION: Primary | ICD-10-CM

## 2022-08-05 PROCEDURE — 90471 IMMUNIZATION ADMIN: CPT | Performed by: FAMILY MEDICINE

## 2022-08-05 PROCEDURE — 90632 HEPA VACCINE ADULT IM: CPT | Performed by: FAMILY MEDICINE

## 2022-09-21 ENCOUNTER — TELEPHONE (OUTPATIENT)
Dept: FAMILY MEDICINE CLINIC | Facility: CLINIC | Age: 72
End: 2022-09-21

## 2022-09-21 RX ORDER — ERGOCALCIFEROL 1.25 MG/1
CAPSULE ORAL
Qty: 13 CAPSULE | Refills: 0 | Status: SHIPPED | OUTPATIENT
Start: 2022-09-21 | End: 2022-12-20 | Stop reason: SDUPTHER

## 2022-09-21 NOTE — TELEPHONE ENCOUNTER
Patient requested a refill of Vitamin D 50,000 units on 9/15/2022 and this has not been addressed yet.  Patient leaves in the morning for out of town for a month or so.  Can this be refilled today or what do you want me to tell the patient?

## 2022-10-27 DIAGNOSIS — R73.09 ELEVATED HEMOGLOBIN A1C: ICD-10-CM

## 2022-10-27 DIAGNOSIS — E78.2 MIXED HYPERLIPIDEMIA: Primary | ICD-10-CM

## 2022-11-04 ENCOUNTER — LAB (OUTPATIENT)
Dept: FAMILY MEDICINE CLINIC | Facility: CLINIC | Age: 72
End: 2022-11-04

## 2022-11-04 DIAGNOSIS — E78.2 MIXED HYPERLIPIDEMIA: ICD-10-CM

## 2022-11-04 DIAGNOSIS — R73.09 ELEVATED HEMOGLOBIN A1C: ICD-10-CM

## 2022-11-04 LAB
ALBUMIN SERPL-MCNC: 4.1 G/DL (ref 3.5–5.2)
ALBUMIN/GLOB SERPL: 1.6 G/DL
ALP SERPL-CCNC: 48 U/L (ref 39–117)
ALT SERPL W P-5'-P-CCNC: 15 U/L (ref 1–41)
ANION GAP SERPL CALCULATED.3IONS-SCNC: 7.5 MMOL/L (ref 5–15)
AST SERPL-CCNC: 22 U/L (ref 1–40)
BASOPHILS # BLD AUTO: 0.08 10*3/MM3 (ref 0–0.2)
BASOPHILS NFR BLD AUTO: 1 % (ref 0–1.5)
BILIRUB SERPL-MCNC: 0.8 MG/DL (ref 0–1.2)
BUN SERPL-MCNC: 14 MG/DL (ref 8–23)
BUN/CREAT SERPL: 11.3 (ref 7–25)
CALCIUM SPEC-SCNC: 9 MG/DL (ref 8.6–10.5)
CHLORIDE SERPL-SCNC: 100 MMOL/L (ref 98–107)
CHOLEST SERPL-MCNC: 179 MG/DL (ref 0–200)
CO2 SERPL-SCNC: 31.5 MMOL/L (ref 22–29)
CREAT SERPL-MCNC: 1.24 MG/DL (ref 0.76–1.27)
DEPRECATED RDW RBC AUTO: 41.1 FL (ref 37–54)
EGFRCR SERPLBLD CKD-EPI 2021: 62.2 ML/MIN/1.73
EOSINOPHIL # BLD AUTO: 0.37 10*3/MM3 (ref 0–0.4)
EOSINOPHIL NFR BLD AUTO: 4.6 % (ref 0.3–6.2)
ERYTHROCYTE [DISTWIDTH] IN BLOOD BY AUTOMATED COUNT: 13.1 % (ref 12.3–15.4)
GLOBULIN UR ELPH-MCNC: 2.6 GM/DL
GLUCOSE SERPL-MCNC: 88 MG/DL (ref 65–99)
HBA1C MFR BLD: 5.7 % (ref 3.5–5.6)
HCT VFR BLD AUTO: 39.8 % (ref 37.5–51)
HDLC SERPL-MCNC: 46 MG/DL (ref 40–60)
HGB BLD-MCNC: 13.3 G/DL (ref 13–17.7)
IMM GRANULOCYTES # BLD AUTO: 0.05 10*3/MM3 (ref 0–0.05)
IMM GRANULOCYTES NFR BLD AUTO: 0.6 % (ref 0–0.5)
LDLC SERPL CALC-MCNC: 110 MG/DL (ref 0–100)
LDLC/HDLC SERPL: 2.32 {RATIO}
LYMPHOCYTES # BLD AUTO: 2.25 10*3/MM3 (ref 0.7–3.1)
LYMPHOCYTES NFR BLD AUTO: 28.1 % (ref 19.6–45.3)
MCH RBC QN AUTO: 29.1 PG (ref 26.6–33)
MCHC RBC AUTO-ENTMCNC: 33.4 G/DL (ref 31.5–35.7)
MCV RBC AUTO: 87.1 FL (ref 79–97)
MONOCYTES # BLD AUTO: 1.01 10*3/MM3 (ref 0.1–0.9)
MONOCYTES NFR BLD AUTO: 12.6 % (ref 5–12)
NEUTROPHILS NFR BLD AUTO: 4.24 10*3/MM3 (ref 1.7–7)
NEUTROPHILS NFR BLD AUTO: 53.1 % (ref 42.7–76)
NRBC BLD AUTO-RTO: 0 /100 WBC (ref 0–0.2)
PLATELET # BLD AUTO: 322 10*3/MM3 (ref 140–450)
PMV BLD AUTO: 9.1 FL (ref 6–12)
POTASSIUM SERPL-SCNC: 3.9 MMOL/L (ref 3.5–5.2)
PROT SERPL-MCNC: 6.7 G/DL (ref 6–8.5)
RBC # BLD AUTO: 4.57 10*6/MM3 (ref 4.14–5.8)
SODIUM SERPL-SCNC: 139 MMOL/L (ref 136–145)
TRIGL SERPL-MCNC: 131 MG/DL (ref 0–150)
VLDLC SERPL-MCNC: 23 MG/DL (ref 5–40)
WBC NRBC COR # BLD: 8 10*3/MM3 (ref 3.4–10.8)

## 2022-11-04 PROCEDURE — 80061 LIPID PANEL: CPT | Performed by: FAMILY MEDICINE

## 2022-11-04 PROCEDURE — 36415 COLL VENOUS BLD VENIPUNCTURE: CPT

## 2022-11-04 PROCEDURE — 85025 COMPLETE CBC W/AUTO DIFF WBC: CPT | Performed by: FAMILY MEDICINE

## 2022-11-04 PROCEDURE — 83036 HEMOGLOBIN GLYCOSYLATED A1C: CPT | Performed by: FAMILY MEDICINE

## 2022-11-04 PROCEDURE — 80053 COMPREHEN METABOLIC PANEL: CPT | Performed by: FAMILY MEDICINE

## 2022-11-11 ENCOUNTER — OFFICE VISIT (OUTPATIENT)
Dept: FAMILY MEDICINE CLINIC | Facility: CLINIC | Age: 72
End: 2022-11-11

## 2022-11-11 VITALS
HEART RATE: 98 BPM | WEIGHT: 230 LBS | RESPIRATION RATE: 16 BRPM | OXYGEN SATURATION: 100 % | TEMPERATURE: 97.8 F | HEIGHT: 71 IN | SYSTOLIC BLOOD PRESSURE: 154 MMHG | DIASTOLIC BLOOD PRESSURE: 88 MMHG | BODY MASS INDEX: 32.2 KG/M2

## 2022-11-11 DIAGNOSIS — I10 PRIMARY HYPERTENSION: ICD-10-CM

## 2022-11-11 DIAGNOSIS — E78.2 MIXED HYPERLIPIDEMIA: Primary | ICD-10-CM

## 2022-11-11 PROCEDURE — 99214 OFFICE O/P EST MOD 30 MIN: CPT | Performed by: FAMILY MEDICINE

## 2022-11-11 RX ORDER — TRIAMTERENE AND HYDROCHLOROTHIAZIDE 37.5; 25 MG/1; MG/1
1 CAPSULE ORAL DAILY
Qty: 90 CAPSULE | Refills: 3 | Status: SHIPPED | OUTPATIENT
Start: 2022-11-11 | End: 2023-02-09

## 2022-11-11 RX ORDER — ATENOLOL 50 MG/1
50 TABLET ORAL 2 TIMES DAILY
Qty: 180 TABLET | Refills: 1 | Status: SHIPPED | OUTPATIENT
Start: 2022-11-11 | End: 2023-02-09

## 2022-11-11 RX ORDER — LISINOPRIL 40 MG/1
40 TABLET ORAL DAILY
Qty: 90 TABLET | Refills: 3 | Status: SHIPPED | OUTPATIENT
Start: 2022-11-11 | End: 2023-02-09

## 2022-11-11 RX ORDER — SILDENAFIL 100 MG/1
100 TABLET, FILM COATED ORAL AS NEEDED
Qty: 25 TABLET | Refills: 6 | Status: SHIPPED | OUTPATIENT
Start: 2022-11-11

## 2022-11-11 NOTE — PROGRESS NOTES
"Chief Complaint  medication refills    Subjective      Luis Enrique Aldana presents to [facility]  History of Present Illness  The patient has consented to being recorded using BUTCH.    The patient presents today for a follow-up.    His blood pressure is slightly elevated in the office today at 154/88 mmHg. He states that he monitors his blood pressure at home and it averages 140/90 mmHg. He states that he did not notice a difference when he increased his atenolol. The patient states that he is sleeping well and wakes up feeling rested. He denies fatigue during the day. He is currently taking atenolol 50 mg, lisinopril 20 mg, and triamterene 37.5/25 mg. The patient does exercise.     The patient states that he had a lens placed in 2016. He states that he is seeing well.    The patient states that he saw a dermatologist on 11/10/2022 and was diagnosed with tinea versicolor. He was prescribed an ointment.    The patient states that he has received the COVID-19 vaccine and booster. He has not had his shingles vaccinations.     He is asking for a refill on his Viagra.     Objective   Vital Signs:  /88   Pulse 98   Temp 97.8 °F (36.6 °C) (Infrared)   Resp 16   Ht 180.3 cm (70.98\")   Wt 104 kg (230 lb)   SpO2 100%   BMI 32.09 kg/m²   Estimated body mass index is 32.09 kg/m² as calculated from the following:    Height as of this encounter: 180.3 cm (70.98\").    Weight as of this encounter: 104 kg (230 lb).    BMI is >= 30 and <35. (Class 1 Obesity). The following options were offered after discussion;: weight loss educational material (shared in after visit summary), exercise counseling/recommendations and nutrition counseling/recommendations      Physical Exam  Constitutional:       Appearance: Normal appearance. He is well-developed and normal weight.   HENT:      Head: Normocephalic and atraumatic.      Right Ear: Tympanic membrane, ear canal and external ear normal.      Left " Ear: Tympanic membrane, ear canal and external ear normal.      Nose: Nose normal.      Mouth/Throat:      Mouth: Mucous membranes are moist.      Pharynx: Oropharynx is clear. No oropharyngeal exudate.   Eyes:      Extraocular Movements: Extraocular movements intact.      Conjunctiva/sclera: Conjunctivae normal.      Pupils: Pupils are equal, round, and reactive to light.   Cardiovascular:      Rate and Rhythm: Normal rate and regular rhythm.      Pulses: Normal pulses.      Heart sounds: Normal heart sounds.   Pulmonary:      Effort: Pulmonary effort is normal.      Breath sounds: Normal breath sounds.   Abdominal:      General: Bowel sounds are normal.      Palpations: Abdomen is soft.   Musculoskeletal:         General: Normal range of motion.      Cervical back: Normal range of motion and neck supple.   Skin:     General: Skin is warm and dry.   Neurological:      General: No focal deficit present.      Mental Status: He is alert and oriented to person, place, and time. Mental status is at baseline.   Psychiatric:         Mood and Affect: Mood normal.         Behavior: Behavior normal.         Thought Content: Thought content normal.         Judgment: Judgment normal.       Assessment and Plan         #1--Hyperlipidemia  Miguelito has high cholesterol and tries to follow a low carb diet. He does a fairly good job because his most recent lipid panel was actually excellent with only a slight elevation of his LDL. Total cholesterol and his bicarb was pretty good and not worth starting any statins right now. He is doing well with just diet and exercise and we will discontinue that.    #2--Primary hypertension  Blood pressure is high and has been running high in the 150s a lot at home and also here in the office. I am going to increase his lisinopril to 40 mg a day and we have already increased his atenolol to 50 mg twice a day. We are going to give him 3 months at these higher doses and then he is going to send me a list  of about 1 week's worth of blood pressure readings morning and evening. Depending on how he is doing, we will either leave him on this higher doses of medicine or we will add in a third medicine. Overall, he should be doing better very soon.       I spent 35 minutes caring for Luis Enrique on this date of service. This time includes time spent by me in the following activities:preparing for the visit, reviewing tests, obtaining and/or reviewing a separately obtained history, performing a medically appropriate examination and/or evaluation , counseling and educating the patient/family/caregiver, ordering medications, tests, or procedures, referring and communicating with other health care professionals , documenting information in the medical record, independently interpreting results and communicating that information with the patient/family/caregiver and care coordination       Follow Up  Patient was given instructions and counseling regarding his condition or for health maintenance advice. Please see specific information pulled into the AVS if appropriate.       Answers for HPI/ROS submitted by the patient on 11/8/2022  What is the primary reason for your visit?: Other  Please describe your symptoms.: Annual checkup , No symptoms  Have you had these symptoms before?: Yes  How long have you been having these symptoms?: Greater than 2 weeks    Transcribed from ambient dictation for Agustin Fonseca MD by Miladis Ag.  11/11/22   16:05 EST    Patient or patient representative verbalized consent to the visit recording.  I have personally performed the services described in this document as transcribed by the above individual, and it is both accurate and complete.  Agustin Fonseca MD  11/14/2022  10:29 EST

## 2022-12-20 ENCOUNTER — TELEPHONE (OUTPATIENT)
Dept: FAMILY MEDICINE CLINIC | Facility: CLINIC | Age: 72
End: 2022-12-20

## 2022-12-20 DIAGNOSIS — E55.9 VITAMIN D DEFICIENCY: Primary | ICD-10-CM

## 2022-12-20 RX ORDER — ERGOCALCIFEROL 1.25 MG/1
50000 CAPSULE ORAL
Qty: 12 CAPSULE | Refills: 3 | Status: SHIPPED | OUTPATIENT
Start: 2022-12-20

## 2022-12-20 NOTE — TELEPHONE ENCOUNTER
"TRIED TO REACH PATIENT     FOR HUB TO SAY:    \"Refill sent in a 3 month supply was sent in in sept its 1 pill a week\"  "

## 2022-12-20 NOTE — TELEPHONE ENCOUNTER
Caller: Luis Enrique Aldana    Relationship: Self    Best call back number: [phone]    What medications are you currently taking:   Current Outpatient Medications on File Prior to Visit   Medication Sig Dispense Refill   • albuterol sulfate  (90 Base) MCG/ACT inhaler Inhale 2 puffs Every 4 (Four) Hours As Needed for Wheezing. 8 g 0   • aspirin 81 MG EC tablet Take 81 mg by mouth Daily.     • atenolol (TENORMIN) 50 MG tablet Take 1 tablet by mouth 2 (Two) Times a Day for 90 days. 180 tablet 1   • lisinopril (PRINIVIL,ZESTRIL) 40 MG tablet Take 1 tablet by mouth Daily for 90 days. 90 tablet 3   • promethazine-dextromethorphan (PROMETHAZINE-DM) 6.25-15 MG/5ML syrup Take 5 mL by mouth At Night As Needed for Cough. 90 mL 0   • sildenafil (VIAGRA) 100 MG tablet Take 1 tablet by mouth As Needed for Erectile Dysfunction for up to 25 doses. 25 tablet 6   • tiZANidine (ZANAFLEX) 4 MG tablet TAKE 1 TABLET BY MOUTH EVERY 8 HOURS AS NEEDED FOR MUSCLE SPASMS 30 tablet 0   • triamterene-hydrochlorothiazide (DYAZIDE) 37.5-25 MG per capsule Take 1 capsule by mouth Daily for 90 days. 90 capsule 3   • vitamin D (ERGOCALCIFEROL) 1.25 MG (03348 UT) capsule capsule TAKE 1 CAPSULE BY MOUTH WEEKLY 13 capsule 0     No current facility-administered medications on file prior to visit.          When did you start taking these medications: 09/21/22    Which medication are you concerned about:   vitamin D (ERGOCALCIFEROL) 1.25 MG (76502 UT) capsule capsule   0 ordered         Who prescribed you this medication: DR. BURRELL    What are your concerns: PATIENT SAID THE LAST TIME THIS WAS FILLED HE JUST RECEIVED ONE TABLET, OR ONE WEEKS WORTH OF MEDICATION    HE SAID HE NORMALLY GETS MORE IN A REFILL AND WANTED TO SEE WHAT HE NEEDED TO DO    How long have you had these concerns: N/A

## 2023-01-19 ENCOUNTER — OFFICE (AMBULATORY)
Dept: URBAN - METROPOLITAN AREA PATHOLOGY 4 | Facility: PATHOLOGY | Age: 73
End: 2023-01-19
Payer: COMMERCIAL

## 2023-01-19 ENCOUNTER — ON CAMPUS - OUTPATIENT (AMBULATORY)
Dept: URBAN - METROPOLITAN AREA HOSPITAL 2 | Facility: HOSPITAL | Age: 73
End: 2023-01-19

## 2023-01-19 ENCOUNTER — OFFICE (AMBULATORY)
Dept: URBAN - METROPOLITAN AREA PATHOLOGY 4 | Facility: PATHOLOGY | Age: 73
End: 2023-01-19

## 2023-01-19 VITALS
SYSTOLIC BLOOD PRESSURE: 103 MMHG | TEMPERATURE: 98.6 F | HEART RATE: 59 BPM | RESPIRATION RATE: 16 BRPM | SYSTOLIC BLOOD PRESSURE: 95 MMHG | DIASTOLIC BLOOD PRESSURE: 72 MMHG | SYSTOLIC BLOOD PRESSURE: 91 MMHG | WEIGHT: 230 LBS | HEART RATE: 62 BPM | HEART RATE: 60 BPM | DIASTOLIC BLOOD PRESSURE: 67 MMHG | OXYGEN SATURATION: 96 % | DIASTOLIC BLOOD PRESSURE: 102 MMHG | SYSTOLIC BLOOD PRESSURE: 108 MMHG | DIASTOLIC BLOOD PRESSURE: 63 MMHG | OXYGEN SATURATION: 99 % | HEART RATE: 61 BPM | OXYGEN SATURATION: 95 % | DIASTOLIC BLOOD PRESSURE: 92 MMHG | SYSTOLIC BLOOD PRESSURE: 152 MMHG | OXYGEN SATURATION: 97 % | OXYGEN SATURATION: 98 % | DIASTOLIC BLOOD PRESSURE: 74 MMHG | HEIGHT: 72 IN | OXYGEN SATURATION: 100 % | SYSTOLIC BLOOD PRESSURE: 106 MMHG | SYSTOLIC BLOOD PRESSURE: 137 MMHG

## 2023-01-19 DIAGNOSIS — K51.40 INFLAMMATORY POLYPS OF COLON WITHOUT COMPLICATIONS: ICD-10-CM

## 2023-01-19 DIAGNOSIS — D12.2 BENIGN NEOPLASM OF ASCENDING COLON: ICD-10-CM

## 2023-01-19 DIAGNOSIS — Z86.010 PERSONAL HISTORY OF COLONIC POLYPS: ICD-10-CM

## 2023-01-19 PROBLEM — K63.5 POLYP OF COLON: Status: ACTIVE | Noted: 2023-01-19

## 2023-01-19 LAB
GI HISTOLOGY: A. UNSPECIFIED: (no result)
GI HISTOLOGY: PDF REPORT: (no result)

## 2023-01-19 PROCEDURE — 45385 COLONOSCOPY W/LESION REMOVAL: CPT | Mod: PT | Performed by: INTERNAL MEDICINE

## 2023-01-19 PROCEDURE — 88305 TISSUE EXAM BY PATHOLOGIST: CPT | Mod: 26 | Performed by: INTERNAL MEDICINE

## 2023-04-12 RX ORDER — ATENOLOL 50 MG/1
TABLET ORAL
Qty: 180 TABLET | Refills: 3 | Status: SHIPPED | OUTPATIENT
Start: 2023-04-12

## 2023-08-07 RX ORDER — TRIAMTERENE AND HYDROCHLOROTHIAZIDE 37.5; 25 MG/1; MG/1
1 CAPSULE ORAL DAILY
Qty: 90 CAPSULE | Refills: 1 | Status: SHIPPED | OUTPATIENT
Start: 2023-08-07

## 2023-10-05 RX ORDER — LISINOPRIL 40 MG/1
TABLET ORAL
Qty: 90 TABLET | Refills: 3 | Status: SHIPPED | OUTPATIENT
Start: 2023-10-05

## 2023-12-26 ENCOUNTER — OFFICE VISIT (OUTPATIENT)
Dept: FAMILY MEDICINE CLINIC | Facility: CLINIC | Age: 73
End: 2023-12-26
Payer: MEDICARE

## 2023-12-26 VITALS
DIASTOLIC BLOOD PRESSURE: 97 MMHG | HEIGHT: 71 IN | SYSTOLIC BLOOD PRESSURE: 162 MMHG | OXYGEN SATURATION: 97 % | WEIGHT: 242.2 LBS | BODY MASS INDEX: 33.91 KG/M2 | RESPIRATION RATE: 18 BRPM | HEART RATE: 67 BPM

## 2023-12-26 DIAGNOSIS — J20.9 ACUTE BRONCHITIS, UNSPECIFIED ORGANISM: Primary | ICD-10-CM

## 2023-12-26 PROCEDURE — 1160F RVW MEDS BY RX/DR IN RCRD: CPT | Performed by: PHYSICIAN ASSISTANT

## 2023-12-26 PROCEDURE — 3077F SYST BP >= 140 MM HG: CPT | Performed by: PHYSICIAN ASSISTANT

## 2023-12-26 PROCEDURE — 1159F MED LIST DOCD IN RCRD: CPT | Performed by: PHYSICIAN ASSISTANT

## 2023-12-26 PROCEDURE — 3080F DIAST BP >= 90 MM HG: CPT | Performed by: PHYSICIAN ASSISTANT

## 2023-12-26 PROCEDURE — 99213 OFFICE O/P EST LOW 20 MIN: CPT | Performed by: PHYSICIAN ASSISTANT

## 2023-12-26 RX ORDER — GUAIFENESIN AND DEXTROMETHORPHAN HYDROBROMIDE 600; 30 MG/1; MG/1
1 TABLET, EXTENDED RELEASE ORAL 2 TIMES DAILY PRN
Qty: 20 TABLET | Refills: 0 | Status: SHIPPED | OUTPATIENT
Start: 2023-12-26

## 2023-12-26 RX ORDER — DOXYCYCLINE HYCLATE 100 MG/1
100 CAPSULE ORAL 2 TIMES DAILY
Qty: 20 CAPSULE | Refills: 0 | Status: SHIPPED | OUTPATIENT
Start: 2023-12-26 | End: 2024-01-05

## 2023-12-26 NOTE — PROGRESS NOTES
"Subjective   Luis Enrique Aldana is a 73 y.o. male.     Chief Complaint   Patient presents with    Cough    Fatigue    URI       /97   Pulse 67   Resp 18   Ht 180.3 cm (70.98\")   Wt 110 kg (242 lb 3.2 oz)   SpO2 97%   BMI 33.80 kg/m²     BP Readings from Last 3 Encounters:   12/26/23 162/97   11/11/22 154/88   10/25/22 146/88       Wt Readings from Last 3 Encounters:   12/26/23 110 kg (242 lb 3.2 oz)   11/11/22 104 kg (230 lb)   10/25/22 98.9 kg (218 lb)       Cough    Fatigue  Associated symptoms include coughing and fatigue.   URI   Associated symptoms include coughing.    Presents to the clinic for persistent cough, congestion, pressure that has been present for the past 2 weeks. He has had production with the cough. It is thick. No fevers or chills. No swelling in legs. No orthopnea.     The following portions of the patient's history were reviewed and updated as appropriate: allergies, current medications, past family history, past medical history, past social history, past surgical history, and problem list.    Review of Systems   Constitutional:  Positive for fatigue.   Respiratory:  Positive for cough.        Objective   Physical Exam  Constitutional:       Appearance: Normal appearance.   HENT:      Right Ear: Tympanic membrane normal.      Left Ear: Tympanic membrane normal.      Nose: Congestion present.      Mouth/Throat:      Pharynx: No oropharyngeal exudate.   Eyes:      Extraocular Movements: Extraocular movements intact.      Pupils: Pupils are equal, round, and reactive to light.   Cardiovascular:      Rate and Rhythm: Normal rate.      Heart sounds: No murmur heard.  Pulmonary:      Effort: Pulmonary effort is normal.      Breath sounds: No wheezing.   Neurological:      General: No focal deficit present.      Mental Status: He is alert and oriented to person, place, and time.   Psychiatric:         Mood and Affect: Mood normal.         Behavior: Behavior normal.           Diagnoses and all " orders for this visit:    1. Acute bronchitis, unspecified organism (Primary)  Comments:  due to consistent sx for 2 weeks. treat with doxy. consider imaging or other cause for cough if not better in 7 days.    Other orders  -     doxycycline (VIBRAMYCIN) 100 MG capsule; Take 1 capsule by mouth 2 (Two) Times a Day for 10 days.  Dispense: 20 capsule; Refill: 0  -     guaifenesin-dextromethorphan (MUCINEX DM)  MG tablet sustained-release 12 hour tablet; Take 1 tablet by mouth 2 (Two) Times a Day As Needed (cough).  Dispense: 20 tablet; Refill: 0    F/u with  for elevated bp     Return if symptoms worsen or fail to improve.

## 2023-12-28 ENCOUNTER — TELEPHONE (OUTPATIENT)
Dept: FAMILY MEDICINE CLINIC | Facility: CLINIC | Age: 73
End: 2023-12-28
Payer: MEDICARE

## 2023-12-28 DIAGNOSIS — R05.2 SUBACUTE COUGH: Primary | ICD-10-CM

## 2023-12-28 RX ORDER — HYDROCODONE BITARTRATE AND HOMATROPINE METHYLBROMIDE ORAL SOLUTION 5; 1.5 MG/5ML; MG/5ML
5 LIQUID ORAL EVERY 4 HOURS PRN
Qty: 120 ML | Refills: 0 | Status: SHIPPED | OUTPATIENT
Start: 2023-12-28 | End: 2024-01-04

## 2023-12-28 NOTE — TELEPHONE ENCOUNTER
"  Caller: Luis Enrique Aldana \"Miguelito\"    Relationship: Self    Best call back number: [phone]     What medication are you requesting: COUGH SUPRESSANT     What are your current symptoms: PERSISTENT COUGH     How long have you been experiencing symptoms: FEW WEEKS     Have you had these symptoms before:    [x] Yes  [] No    Have you been treated for these symptoms before:   [x] Yes  [] No    If a prescription is needed, what is your preferred pharmacy and phone number: [id number] [place] [id number] AT [facility] OF [place] [address] & [facility] - [phone] [facility] [phone]      Additional notes:    PATIENT STILL TAKING HIS ANTIBIOTICS DR BURRELL PRESCRIBED.    PLEASE ADVISE   "

## 2023-12-31 DIAGNOSIS — E55.9 VITAMIN D DEFICIENCY: ICD-10-CM

## 2024-01-01 RX ORDER — ERGOCALCIFEROL 1.25 MG/1
50000 CAPSULE ORAL
Qty: 12 CAPSULE | Refills: 3 | Status: SHIPPED | OUTPATIENT
Start: 2024-01-01

## 2024-02-05 RX ORDER — TRIAMTERENE AND HYDROCHLOROTHIAZIDE 37.5; 25 MG/1; MG/1
1 CAPSULE ORAL DAILY
Qty: 90 CAPSULE | Refills: 1 | Status: SHIPPED | OUTPATIENT
Start: 2024-02-05

## 2024-05-30 RX ORDER — ATENOLOL 50 MG/1
TABLET ORAL
Qty: 180 TABLET | Refills: 3 | Status: SHIPPED | OUTPATIENT
Start: 2024-05-30

## 2024-08-09 RX ORDER — TRIAMTERENE AND HYDROCHLOROTHIAZIDE 37.5; 25 MG/1; MG/1
1 CAPSULE ORAL DAILY
Qty: 90 CAPSULE | Refills: 0 | Status: SHIPPED | OUTPATIENT
Start: 2024-08-09

## 2024-09-11 DIAGNOSIS — E55.9 VITAMIN D DEFICIENCY: ICD-10-CM

## 2024-09-12 RX ORDER — ERGOCALCIFEROL 1.25 MG/1
50000 CAPSULE, LIQUID FILLED ORAL
Qty: 12 CAPSULE | Refills: 0 | Status: SHIPPED | OUTPATIENT
Start: 2024-09-12

## 2024-12-22 RX ORDER — TRIAMTERENE AND HYDROCHLOROTHIAZIDE 37.5; 25 MG/1; MG/1
1 CAPSULE ORAL DAILY
Qty: 90 CAPSULE | Refills: 3 | Status: SHIPPED | OUTPATIENT
Start: 2024-12-22

## 2024-12-22 RX ORDER — LISINOPRIL 40 MG/1
TABLET ORAL
Qty: 90 TABLET | Refills: 3 | Status: SHIPPED | OUTPATIENT
Start: 2024-12-22

## 2024-12-30 ENCOUNTER — TELEPHONE (OUTPATIENT)
Dept: FAMILY MEDICINE CLINIC | Facility: CLINIC | Age: 74
End: 2024-12-30
Payer: MEDICARE

## 2025-01-02 ENCOUNTER — OFFICE VISIT (OUTPATIENT)
Dept: FAMILY MEDICINE CLINIC | Facility: CLINIC | Age: 75
End: 2025-01-02
Payer: MEDICARE

## 2025-01-02 ENCOUNTER — LAB (OUTPATIENT)
Dept: FAMILY MEDICINE CLINIC | Facility: CLINIC | Age: 75
End: 2025-01-02
Payer: MEDICARE

## 2025-01-02 VITALS
HEIGHT: 72 IN | DIASTOLIC BLOOD PRESSURE: 80 MMHG | WEIGHT: 245.3 LBS | BODY MASS INDEX: 33.23 KG/M2 | OXYGEN SATURATION: 99 % | SYSTOLIC BLOOD PRESSURE: 134 MMHG | HEART RATE: 72 BPM | RESPIRATION RATE: 16 BRPM

## 2025-01-02 DIAGNOSIS — E55.9 VITAMIN D DEFICIENCY: ICD-10-CM

## 2025-01-02 DIAGNOSIS — I49.9 IRREGULAR HEART BEAT: ICD-10-CM

## 2025-01-02 DIAGNOSIS — E78.2 MIXED HYPERLIPIDEMIA: ICD-10-CM

## 2025-01-02 DIAGNOSIS — Z12.5 SCREENING PSA (PROSTATE SPECIFIC ANTIGEN): Primary | ICD-10-CM

## 2025-01-02 DIAGNOSIS — I10 PRIMARY HYPERTENSION: ICD-10-CM

## 2025-01-02 DIAGNOSIS — Z00.00 MEDICARE ANNUAL WELLNESS VISIT, SUBSEQUENT: Primary | ICD-10-CM

## 2025-01-02 DIAGNOSIS — Z12.5 SCREENING PSA (PROSTATE SPECIFIC ANTIGEN): ICD-10-CM

## 2025-01-02 DIAGNOSIS — Z00.00 MEDICARE ANNUAL WELLNESS VISIT, SUBSEQUENT: ICD-10-CM

## 2025-01-02 LAB
25(OH)D3 SERPL-MCNC: 42.9 NG/ML (ref 30–100)
ALBUMIN SERPL-MCNC: 4.5 G/DL (ref 3.5–5.2)
ALBUMIN/GLOB SERPL: 1.7 G/DL
ALP SERPL-CCNC: 47 U/L (ref 39–117)
ALT SERPL W P-5'-P-CCNC: 23 U/L (ref 1–41)
ANION GAP SERPL CALCULATED.3IONS-SCNC: 8.1 MMOL/L (ref 5–15)
AST SERPL-CCNC: 35 U/L (ref 1–40)
BASOPHILS # BLD AUTO: 0.07 10*3/MM3 (ref 0–0.2)
BASOPHILS NFR BLD AUTO: 1 % (ref 0–1.5)
BILIRUB SERPL-MCNC: 0.7 MG/DL (ref 0–1.2)
BUN SERPL-MCNC: 18 MG/DL (ref 8–23)
BUN/CREAT SERPL: 13.5 (ref 7–25)
CALCIUM SPEC-SCNC: 9.7 MG/DL (ref 8.6–10.5)
CHLORIDE SERPL-SCNC: 99 MMOL/L (ref 98–107)
CHOLEST SERPL-MCNC: 207 MG/DL (ref 0–200)
CO2 SERPL-SCNC: 30.9 MMOL/L (ref 22–29)
CREAT SERPL-MCNC: 1.33 MG/DL (ref 0.76–1.27)
DEPRECATED RDW RBC AUTO: 41 FL (ref 37–54)
EGFRCR SERPLBLD CKD-EPI 2021: 56.4 ML/MIN/1.73
EOSINOPHIL # BLD AUTO: 0.47 10*3/MM3 (ref 0–0.4)
EOSINOPHIL NFR BLD AUTO: 6.9 % (ref 0.3–6.2)
ERYTHROCYTE [DISTWIDTH] IN BLOOD BY AUTOMATED COUNT: 12.8 % (ref 12.3–15.4)
GLOBULIN UR ELPH-MCNC: 2.7 GM/DL
GLUCOSE SERPL-MCNC: 100 MG/DL (ref 65–99)
HCT VFR BLD AUTO: 44.5 % (ref 37.5–51)
HDLC SERPL-MCNC: 47 MG/DL (ref 40–60)
HGB BLD-MCNC: 14.5 G/DL (ref 13–17.7)
IMM GRANULOCYTES # BLD AUTO: 0.03 10*3/MM3 (ref 0–0.05)
IMM GRANULOCYTES NFR BLD AUTO: 0.4 % (ref 0–0.5)
LDLC SERPL CALC-MCNC: 127 MG/DL (ref 0–100)
LDLC/HDLC SERPL: 2.62 {RATIO}
LYMPHOCYTES # BLD AUTO: 2.01 10*3/MM3 (ref 0.7–3.1)
LYMPHOCYTES NFR BLD AUTO: 29.6 % (ref 19.6–45.3)
MAGNESIUM SERPL-MCNC: 2.2 MG/DL (ref 1.6–2.4)
MCH RBC QN AUTO: 28.7 PG (ref 26.6–33)
MCHC RBC AUTO-ENTMCNC: 32.6 G/DL (ref 31.5–35.7)
MCV RBC AUTO: 88.1 FL (ref 79–97)
MONOCYTES # BLD AUTO: 0.78 10*3/MM3 (ref 0.1–0.9)
MONOCYTES NFR BLD AUTO: 11.5 % (ref 5–12)
NEUTROPHILS NFR BLD AUTO: 3.44 10*3/MM3 (ref 1.7–7)
NEUTROPHILS NFR BLD AUTO: 50.6 % (ref 42.7–76)
NRBC BLD AUTO-RTO: 0 /100 WBC (ref 0–0.2)
PLATELET # BLD AUTO: 281 10*3/MM3 (ref 140–450)
PMV BLD AUTO: 9.8 FL (ref 6–12)
POTASSIUM SERPL-SCNC: 4.2 MMOL/L (ref 3.5–5.2)
PROT SERPL-MCNC: 7.2 G/DL (ref 6–8.5)
PSA SERPL-MCNC: 1.38 NG/ML (ref 0–4)
RBC # BLD AUTO: 5.05 10*6/MM3 (ref 4.14–5.8)
SODIUM SERPL-SCNC: 138 MMOL/L (ref 136–145)
TRIGL SERPL-MCNC: 184 MG/DL (ref 0–150)
TSH SERPL DL<=0.05 MIU/L-ACNC: 3.15 UIU/ML (ref 0.27–4.2)
VLDLC SERPL-MCNC: 33 MG/DL (ref 5–40)
WBC NRBC COR # BLD AUTO: 6.8 10*3/MM3 (ref 3.4–10.8)

## 2025-01-02 PROCEDURE — 1125F AMNT PAIN NOTED PAIN PRSNT: CPT | Performed by: PHYSICIAN ASSISTANT

## 2025-01-02 PROCEDURE — 36415 COLL VENOUS BLD VENIPUNCTURE: CPT

## 2025-01-02 PROCEDURE — G0439 PPPS, SUBSEQ VISIT: HCPCS | Performed by: PHYSICIAN ASSISTANT

## 2025-01-02 PROCEDURE — 99214 OFFICE O/P EST MOD 30 MIN: CPT | Performed by: PHYSICIAN ASSISTANT

## 2025-01-02 PROCEDURE — 82306 VITAMIN D 25 HYDROXY: CPT | Performed by: PHYSICIAN ASSISTANT

## 2025-01-02 PROCEDURE — 1159F MED LIST DOCD IN RCRD: CPT | Performed by: PHYSICIAN ASSISTANT

## 2025-01-02 PROCEDURE — 85025 COMPLETE CBC W/AUTO DIFF WBC: CPT | Performed by: PHYSICIAN ASSISTANT

## 2025-01-02 PROCEDURE — 84443 ASSAY THYROID STIM HORMONE: CPT | Performed by: PHYSICIAN ASSISTANT

## 2025-01-02 PROCEDURE — 80053 COMPREHEN METABOLIC PANEL: CPT | Performed by: PHYSICIAN ASSISTANT

## 2025-01-02 PROCEDURE — 1160F RVW MEDS BY RX/DR IN RCRD: CPT | Performed by: PHYSICIAN ASSISTANT

## 2025-01-02 PROCEDURE — 3075F SYST BP GE 130 - 139MM HG: CPT | Performed by: PHYSICIAN ASSISTANT

## 2025-01-02 PROCEDURE — 80061 LIPID PANEL: CPT | Performed by: PHYSICIAN ASSISTANT

## 2025-01-02 PROCEDURE — 83735 ASSAY OF MAGNESIUM: CPT | Performed by: PHYSICIAN ASSISTANT

## 2025-01-02 PROCEDURE — G0103 PSA SCREENING: HCPCS | Performed by: PHYSICIAN ASSISTANT

## 2025-01-02 PROCEDURE — 3079F DIAST BP 80-89 MM HG: CPT | Performed by: PHYSICIAN ASSISTANT

## 2025-01-02 NOTE — ASSESSMENT & PLAN NOTE
Lipid abnormalities are stable    Plan:  Continue same medication/s without change.      Discussed medication dosage, use, side effects, and goals of treatment in detail.    Counseled patient on lifestyle modifications to help control hyperlipidemia.     Patient Treatment Goals:   LDL goal is less than 70    Followup in 6 months.    Orders:    Lipid panel; Future

## 2025-01-02 NOTE — PROGRESS NOTES
Subjective   The ABCs of the Annual Wellness Visit  Medicare Wellness Visit      Luis Enrique Aldana is a 74 y.o. patient who presents for a Medicare Wellness Visit.    The following portions of the patient's history were reviewed and   updated as appropriate: allergies, current medications, past family history, past medical history, past social history, past surgical history, and problem list.    Compared to one year ago, the patient's physical   health is the same.  Compared to one year ago, the patient's mental   health is the same.    Recent Hospitalizations:  He was not admitted to the hospital during the last year.     Current Medical Providers:  Patient Care Team:  Agustin Fonseca MD as PCP - General (Family Medicine)    Outpatient Medications Prior to Visit   Medication Sig Dispense Refill    aspirin 81 MG EC tablet Take 1 tablet by mouth Daily.      atenolol (TENORMIN) 50 MG tablet TAKE 1 TABLET TWICE A  tablet 3    lisinopril (PRINIVIL,ZESTRIL) 40 MG tablet TAKE 1 TABLET DAILY 90 tablet 3    sildenafil (VIAGRA) 100 MG tablet Take 1 tablet by mouth As Needed for Erectile Dysfunction for up to 25 doses. 25 tablet 6    triamterene-hydrochlorothiazide (DYAZIDE) 37.5-25 MG per capsule TAKE 1 CAPSULE DAILY 90 capsule 3    vitamin D (ERGOCALCIFEROL) 1.25 MG (48345 UT) capsule capsule TAKE 1 CAPSULE BY MOUTH EVERY 7 DAYS 12 capsule 0    benzonatate (TESSALON) 200 MG capsule Take 1 capsule by mouth 3 (Three) Times a Day As Needed for Cough. 21 capsule 1    guaifenesin-dextromethorphan (MUCINEX DM)  MG tablet sustained-release 12 hour tablet Take 1 tablet by mouth 2 (Two) Times a Day As Needed (cough). 20 tablet 0    methylPREDNISolone (MEDROL) 4 MG dose pack Take as directed on package instructions. 21 tablet 0    promethazine-dextromethorphan (PROMETHAZINE-DM) 6.25-15 MG/5ML syrup Take 5 mL by mouth At Night As Needed for Cough. (Patient not taking: Reported on 12/26/2023) 90 mL 0    tiZANidine  "(ZANAFLEX) 4 MG tablet TAKE 1 TABLET BY MOUTH EVERY 8 HOURS AS NEEDED FOR MUSCLE SPASMS (Patient not taking: Reported on 12/26/2023) 30 tablet 0    albuterol sulfate  (90 Base) MCG/ACT inhaler Inhale 2 puffs Every 4 (Four) Hours As Needed for Wheezing. 8 g 0     No facility-administered medications prior to visit.     No opioid medication identified on active medication list. I have reviewed chart for other potential  high risk medication/s and harmful drug interactions in the elderly.      Aspirin is on active medication list. Aspirin use is indicated based on review of current medical condition/s. Pros and cons of this therapy have been discussed today. Benefits of this medication outweigh potential harm.  Patient has been encouraged to continue taking this medication.  .      Patient Active Problem List   Diagnosis    Hyperlipidemia    Hypertension    Vitamin D deficiency    Medicare annual wellness visit, subsequent    Acute bronchitis     Advance Care Planning Advance Directive is on file.  ACP discussion was held with the patient during this visit. Patient has an advance directive in EMR which is still valid.             Objective   Vitals:    01/02/25 1028   BP: 134/80   BP Location: Left arm   Patient Position: Sitting   Cuff Size: Large Adult   Pulse: 72   Resp: 16   SpO2: 99%   Weight: 111 kg (245 lb 4.8 oz)   Height: 182.9 cm (72\")   PainSc:   2       Estimated body mass index is 33.27 kg/m² as calculated from the following:    Height as of this encounter: 182.9 cm (72\").    Weight as of this encounter: 111 kg (245 lb 4.8 oz).    BMI is >= 30 and <35. (Class 1 Obesity). The following options were offered after discussion;: exercise counseling/recommendations and nutrition counseling/recommendations             Does the patient have evidence of cognitive impairment? No                                                                                                Health  Risk Assessment    Smoking " Status:  Social History     Tobacco Use   Smoking Status Never   Smokeless Tobacco Never     Alcohol Consumption:  Social History     Substance and Sexual Activity   Alcohol Use Not Currently    Alcohol/week: 1.0 standard drink of alcohol    Types: 1 Glasses of wine per week    Comment: rarely       Fall Risk Screen  ALANNAH Fall Risk Assessment was completed, and patient is at LOW risk for falls.Assessment completed on:2025    Depression Screening   Little interest or pleasure in doing things? Not at all   Feeling down, depressed, or hopeless? Not at all   PHQ-2 Total Score 0      Health Habits and Functional and Cognitive Screenin/26/2024     9:00 PM   Functional & Cognitive Status   Do you have difficulty preparing food and eating? No    Do you have difficulty bathing yourself, getting dressed or grooming yourself? No    Do you have difficulty using the toilet? No    Do you have difficulty moving around from place to place? No    Do you have trouble with steps or getting out of a bed or a chair? No    Current Diet Well Balanced Diet    Dental Exam Up to date    Eye Exam Up to date    Exercise (times per week) 2 times per week    Current Exercises Include Bicycling Outdoors;Cardiovascular Workout;Home Exercise Program (TV, Computer, Etc.)    Do you need help using the phone?  No    Are you deaf or do you have serious difficulty hearing?  No    Do you need help to go to places out of walking distance? No    Do you need help shopping? No    Do you need help preparing meals?  No    Do you need help with housework?  No    Do you need help with laundry? No    Do you need help taking your medications? No    Do you need help managing money? No    Do you ever drive or ride in a car without wearing a seat belt? No    Have you felt unusual stress, anger or loneliness in the last month? No    Who do you live with? Spouse    If you need help, do you have trouble finding someone available to you? No    Have you  been bothered in the last four weeks by sexual problems? No    Do you have difficulty concentrating, remembering or making decisions? No        Patient-reported           Age-appropriate Screening Schedule:  Refer to the list below for future screening recommendations based on patient's age, sex and/or medical conditions. Orders for these recommended tests are listed in the plan section. The patient has been provided with a written plan.    Health Maintenance List  Health Maintenance   Topic Date Due    HEPATITIS C SCREENING  Never done    LIPID PANEL  11/04/2023    ZOSTER VACCINE (3 of 3) 03/26/2024    ANNUAL WELLNESS VISIT  01/02/2026    BMI FOLLOWUP  01/02/2026    TDAP/TD VACCINES (2 - Td or Tdap) 12/07/2030    COLORECTAL CANCER SCREENING  01/19/2033    COVID-19 Vaccine  Completed    INFLUENZA VACCINE  Completed    Pneumococcal Vaccine 65+  Completed                                                                                                                                                CMS Preventative Services Quick Reference  Risk Factors Identified During Encounter  Immunizations Discussed/Encouraged:   Dental Screening Recommended  Vision Screening Recommended    The above risks/problems have been discussed with the patient.  Pertinent information has been shared with the patient in the After Visit Summary.  An After Visit Summary and PPPS were made available to the patient.    Follow Up:   Next Medicare Wellness visit to be scheduled in 1 year.         Additional E&M Note during same encounter follows:  Patient has additional, significant, and separately identifiable condition(s)/problem(s) that require work above and beyond the Medicare Wellness Visit     Chief Complaint  Medicare Wellness-subsequent    Subjective   HPI  Miguelito is also being seen today for additional medical problem/s.                Objective   Vital Signs:  /80 (BP Location: Left arm, Patient Position: Sitting, Cuff Size: Large  "Adult)   Pulse 72   Resp 16   Ht 182.9 cm (72\")   Wt 111 kg (245 lb 4.8 oz)   SpO2 99%   BMI 33.27 kg/m²   Physical Exam  Constitutional:       Appearance: He is well-developed.   HENT:      Head: Normocephalic and atraumatic.      Right Ear: Tympanic membrane normal.      Left Ear: Tympanic membrane normal.      Nose: No congestion.      Mouth/Throat:      Pharynx: No oropharyngeal exudate.   Eyes:      Conjunctiva/sclera: Conjunctivae normal.      Pupils: Pupils are equal, round, and reactive to light.   Cardiovascular:      Rate and Rhythm: Normal rate and regular rhythm.      Heart sounds: No murmur heard.  Pulmonary:      Effort: Pulmonary effort is normal.      Breath sounds: Normal breath sounds.   Abdominal:      General: Bowel sounds are normal.      Palpations: Abdomen is soft.      Tenderness: There is no abdominal tenderness.   Musculoskeletal:         General: No deformity. Normal range of motion.      Cervical back: Normal range of motion and neck supple.   Lymphadenopathy:      Cervical: No cervical adenopathy.   Skin:     General: Skin is warm and dry.      Findings: No rash.   Neurological:      Mental Status: He is alert and oriented to person, place, and time.      Cranial Nerves: No cranial nerve deficit.      Coordination: Coordination normal.      Gait: Gait normal.   Psychiatric:         Behavior: Behavior normal.         Thought Content: Thought content normal.         Judgment: Judgment normal.         The following data was reviewed by: Todd Jordan PA-C on 01/02/2025:  Data reviewed : Consultant notes colonoscopy             Assessment and Plan Additional age appropriate preventative wellness advice topics were discussed during today's preventative wellness exam(some topics already addressed during AWV portion of the note above):    Physical Activity: Advised cardiovascular activity 150 minutes per week as tolerated. (example brisk walk for 30 minutes, 5 days a week).   "   Nutrition: Discussed nutrition plan with patient. Information shared in after visit summary. Goal is for a well balanced diet to enhance overall health.     Healthy Weight: Discussed current and goal BMI with patient. Steps to attain this goal discussed. Information shared in after visit summary.           Medicare annual wellness visit, subsequent    Orders:    CBC w AUTO Differential; Future    Comprehensive metabolic panel; Future    Magnesium; Future    TSH Rfx On Abnormal To Free T4; Future    Vitamin D 25 hydroxy; Future    Lipid panel; Future    Irregular heart beat    Orders:    Holter Monitor - 72 Hour Up To 15 Days; Future    Vitamin D deficiency    Orders:    Vitamin D 25 hydroxy; Future    Primary hypertension  Hypertension is stable and controlled  Continue current treatment regimen.  Blood pressure will be reassessed in 6 months.    Orders:    CBC w AUTO Differential; Future    Comprehensive metabolic panel; Future    Magnesium; Future    Holter Monitor - 72 Hour Up To 15 Days; Future    TSH Rfx On Abnormal To Free T4; Future    Vitamin D 25 hydroxy; Future    Lipid panel; Future    Mixed hyperlipidemia   Lipid abnormalities are stable    Plan:  Continue same medication/s without change.      Discussed medication dosage, use, side effects, and goals of treatment in detail.    Counseled patient on lifestyle modifications to help control hyperlipidemia.     Patient Treatment Goals:   LDL goal is less than 70    Followup in 6 months.    Orders:    Lipid panel; Future     Healthy eating habits. Low saturated fats. High plant based. Avoid processed foods. 15-30 min of cardiovascular exercise 5 days per week with atleast 2-3 days of resistance training.        I spent 45 minutes caring for Luis Enrique on this date of service. This time includes time spent by me in the following activities:preparing for the visit, reviewing tests, obtaining and/or reviewing a separately obtained history, performing a medically  appropriate examination and/or evaluation , counseling and educating the patient/family/caregiver, ordering medications, tests, or procedures, and documenting information in the medical record  Follow Up   Return in about 1 year (around 1/2/2026), or if symptoms worsen or fail to improve, for Annual physical.  Patient was given instructions and counseling regarding his condition or for health maintenance advice. Please see specific information pulled into the AVS if appropriate.

## 2025-01-02 NOTE — ASSESSMENT & PLAN NOTE
Orders:    CBC w AUTO Differential; Future    Comprehensive metabolic panel; Future    Magnesium; Future    TSH Rfx On Abnormal To Free T4; Future    Vitamin D 25 hydroxy; Future    Lipid panel; Future

## 2025-01-02 NOTE — ASSESSMENT & PLAN NOTE
Hypertension is stable and controlled  Continue current treatment regimen.  Blood pressure will be reassessed in 6 months.    Orders:    CBC w AUTO Differential; Future    Comprehensive metabolic panel; Future    Magnesium; Future    Holter Monitor - 72 Hour Up To 15 Days; Future    TSH Rfx On Abnormal To Free T4; Future    Vitamin D 25 hydroxy; Future    Lipid panel; Future

## 2025-01-06 DIAGNOSIS — N17.9 AKI (ACUTE KIDNEY INJURY): Primary | ICD-10-CM

## 2025-01-08 ENCOUNTER — PATIENT ROUNDING (BHMG ONLY) (OUTPATIENT)
Dept: FAMILY MEDICINE CLINIC | Facility: CLINIC | Age: 75
End: 2025-01-08
Payer: MEDICARE

## 2025-01-08 NOTE — PROGRESS NOTES
January 8, 2025      A [employer] message was sent to Luis Enrique Aldana inquiring about patient's experience at our office during a recent visit.      DOMITILA [place]  [facility]  [address]  [place] [id number].

## 2025-01-13 ENCOUNTER — HOSPITAL ENCOUNTER (OUTPATIENT)
Dept: RESPIRATORY THERAPY | Facility: HOSPITAL | Age: 75
Discharge: HOME | End: 2025-01-13
Admitting: PHYSICIAN ASSISTANT
Payer: MEDICARE

## 2025-01-13 DIAGNOSIS — I10 PRIMARY HYPERTENSION: ICD-10-CM

## 2025-01-13 DIAGNOSIS — I49.9 IRREGULAR HEART BEAT: ICD-10-CM

## 2025-01-13 PROCEDURE — 93246 EXT ECG>7D<15D RECORDING: CPT

## 2025-01-28 LAB
CV ZIO BASELINE AVG BPM: 61 BPM
CV ZIO BASELINE BPM HIGH: 167 BPM
CV ZIO BASELINE BPM LOW: 47 BPM
CV ZIO DEVICE ANALYSIS TIME: NORMAL
CV ZIO ECT SVE COUNT: 536 EPISODES
CV ZIO ECT SVE CPLT COUNT: 15 EPISODES
CV ZIO ECT SVE CPLT FREQ: NORMAL
CV ZIO ECT SVE FREQ: NORMAL
CV ZIO ECT SVE TPLT COUNT: 11 EPISODES
CV ZIO ECT SVE TPLT FREQ: NORMAL
CV ZIO ECT VE COUNT: 103 EPISODES
CV ZIO ECT VE CPLT COUNT: 1 EPISODES
CV ZIO ECT VE CPLT FREQ: NORMAL
CV ZIO ECT VE FREQ: NORMAL
CV ZIO ECT VE TPLT COUNT: 1 EPISODES
CV ZIO ECT VE TPLT FREQ: NORMAL
CV ZIO ECTOPIC SVE COUPLET RAW PERCENT: 0 %
CV ZIO ECTOPIC SVE ISOLATED PERCENT: 0.09 %
CV ZIO ECTOPIC SVE TRIPLET RAW PERCENT: 0.01 %
CV ZIO ECTOPIC VE COUPLET RAW PERCENT: 0 %
CV ZIO ECTOPIC VE ISOLATED PERCENT: 0.02 %
CV ZIO ECTOPIC VE TRIPLET RAW PERCENT: 0 %
CV ZIO ENROLLMENT END: NORMAL
CV ZIO ENROLLMENT START: NORMAL
CV ZIO PATIENT EVENTS DIARIES: 0
CV ZIO PATIENT EVENTS TRIGGERS: 1
CV ZIO PAUSE COUNT: 0
CV ZIO PRESCRIPTION STATUS: NORMAL
CV ZIO SVT AVG BPM: 132 BPM
CV ZIO SVT BPM HIGH: 167 BPM
CV ZIO SVT BPM LOW: 90 BPM
CV ZIO SVT COUNT: 6
CV ZIO SVT F EPI AVG BPM: 157 BPM
CV ZIO SVT F EPI BEATS: 4 BEATS
CV ZIO SVT F EPI BPM HIGH: 167 BPM
CV ZIO SVT F EPI BPM LOW: 143 BPM
CV ZIO SVT F EPI DUR: 1.6 SEC
CV ZIO SVT F EPI END: NORMAL
CV ZIO SVT F EPI START: NORMAL
CV ZIO SVT L EPI AVG BPM: 112 BPM
CV ZIO SVT L EPI BEATS: 10 BEATS
CV ZIO SVT L EPI BPM HIGH: 117 BPM
CV ZIO SVT L EPI BPM LOW: 102 BPM
CV ZIO SVT L EPI DUR: 5.2 SEC
CV ZIO SVT L EPI END: NORMAL
CV ZIO SVT L EPI START: NORMAL
CV ZIO TOTAL  ENROLLMENT PERIOD: NORMAL
CV ZIO VT AVG BPM: 117 BPM
CV ZIO VT BPM HIGH: 121 BPM
CV ZIO VT BPM LOW: 111 BPM
CV ZIO VT COUNT: 1
CV ZIO VT F EPI AVG BPM: 117
CV ZIO VT F EPI BEATS: 4 BEATS
CV ZIO VT F EPI BPM HIGH: 121
CV ZIO VT F EPI BPM LOW: 111
CV ZIO VT F EPI DUR: 1.8 SEC
CV ZIO VT F EPI END: NORMAL
CV ZIO VT F EPI START: NORMAL
CV ZIO VT L EPI AVG BPM: 117
CV ZIO VT L EPI BEATS: 4 BEATS
CV ZIO VT L EPI BPM HIGH: 121 BPM
CV ZIO VT L EPI BPM LOW: 111 BPM
CV ZIO VT L EPI DUR: 1.8
CV ZIO VT L EPI END: NORMAL
CV ZIO VT L EPI START: NORMAL

## 2025-04-07 ENCOUNTER — TELEPHONE (OUTPATIENT)
Dept: FAMILY MEDICINE CLINIC | Facility: CLINIC | Age: 75
End: 2025-04-07

## 2025-04-07 RX ORDER — ATENOLOL 50 MG/1
50 TABLET ORAL 2 TIMES DAILY
Qty: 20 TABLET | Refills: 3 | Status: SHIPPED | OUTPATIENT
Start: 2025-04-07 | End: 2025-04-08

## 2025-04-07 RX ORDER — ATENOLOL 50 MG/1
50 TABLET ORAL 2 TIMES DAILY
Qty: 180 TABLET | Refills: 3 | Status: SHIPPED | OUTPATIENT
Start: 2025-04-07 | End: 2025-04-07 | Stop reason: SDUPTHER

## 2025-04-07 NOTE — TELEPHONE ENCOUNTER
"Caller: Luis Enrique Aldana \"Miguelito\"    Relationship: Self    Best call back number: [phone]    What medication are you requesting: SHORT TERM PRESCRIPTION FOR atenolol (TENORMIN) 50 MG tablet   (10 DAY SUPPLY, 2 A DAY)    Have you had these symptoms before:    [x] Yes  [] No    Have you been treated for these symptoms before:   [x] Yes  [] No    If a prescription is needed, what is your preferred pharmacy and phone number:    [id number] [place] [address] [place] [address] & [facility] - [phone]  - [phone]      Additional notes:  PATIENT STATES THAT [facility] IS REQUESTING AN UPDATED PRESCRIPTION BUT PATIENT IS OUT OF MEDS UNTIL THEN.  "

## 2025-04-08 RX ORDER — ATENOLOL 50 MG/1
50 TABLET ORAL 2 TIMES DAILY
Qty: 180 TABLET | Refills: 3 | Status: SHIPPED | OUTPATIENT
Start: 2025-04-08

## 2025-05-06 DIAGNOSIS — E55.9 VITAMIN D DEFICIENCY: ICD-10-CM

## 2025-05-06 RX ORDER — ERGOCALCIFEROL 1.25 MG/1
50000 CAPSULE, LIQUID FILLED ORAL WEEKLY
Qty: 12 CAPSULE | Refills: 0 | OUTPATIENT
Start: 2025-05-06